# Patient Record
Sex: FEMALE | Race: WHITE | Employment: OTHER | URBAN - NONMETROPOLITAN AREA
[De-identification: names, ages, dates, MRNs, and addresses within clinical notes are randomized per-mention and may not be internally consistent; named-entity substitution may affect disease eponyms.]

---

## 2017-01-05 ENCOUNTER — OFFICE VISIT (OUTPATIENT)
Dept: INTERNAL MEDICINE CLINIC | Age: 67
End: 2017-01-05

## 2017-01-05 VITALS
DIASTOLIC BLOOD PRESSURE: 76 MMHG | WEIGHT: 135.2 LBS | HEART RATE: 60 BPM | OXYGEN SATURATION: 92 % | SYSTOLIC BLOOD PRESSURE: 162 MMHG | HEIGHT: 61 IN | RESPIRATION RATE: 16 BRPM | BODY MASS INDEX: 25.53 KG/M2 | TEMPERATURE: 97.7 F

## 2017-01-05 DIAGNOSIS — I10 HYPERTENSION, ESSENTIAL: ICD-10-CM

## 2017-01-05 DIAGNOSIS — J20.9 ACUTE BRONCHITIS, UNSPECIFIED: Primary | ICD-10-CM

## 2017-01-05 DIAGNOSIS — G25.81 RLS (RESTLESS LEGS SYNDROME): ICD-10-CM

## 2017-01-05 DIAGNOSIS — F10.11 H/O ALCOHOL ABUSE: ICD-10-CM

## 2017-01-05 DIAGNOSIS — M10.9 GOUT, ARTHRITIS: ICD-10-CM

## 2017-01-05 DIAGNOSIS — J44.9 CHRONIC OBSTRUCTIVE PULMONARY DISEASE, UNSPECIFIED COPD TYPE (HCC): ICD-10-CM

## 2017-01-05 DIAGNOSIS — Z72.0 TOBACCO ABUSE DISORDER: ICD-10-CM

## 2017-01-05 PROCEDURE — 99213 OFFICE O/P EST LOW 20 MIN: CPT | Performed by: INTERNAL MEDICINE

## 2017-01-05 RX ORDER — AZITHROMYCIN 250 MG/1
TABLET, FILM COATED ORAL
Qty: 1 PACKET | Refills: 0 | Status: SHIPPED
Start: 2017-01-05 | End: 2020-03-12 | Stop reason: CLARIF

## 2017-01-05 RX ORDER — ROPINIROLE 0.25 MG/1
0.25 TABLET, FILM COATED ORAL NIGHTLY
Qty: 30 TABLET | Refills: 3 | Status: SHIPPED
Start: 2017-01-05 | End: 2020-03-12

## 2017-01-05 RX ORDER — PREDNISONE 20 MG/1
20 TABLET ORAL DAILY
Qty: 5 TABLET | Refills: 0 | Status: SHIPPED | OUTPATIENT
Start: 2017-01-05 | End: 2017-01-10

## 2017-01-05 RX ORDER — DEXTROMETHORPHAN HYDROBROMIDE AND PROMETHAZINE HYDROCHLORIDE 15; 6.25 MG/5ML; MG/5ML
5 SYRUP ORAL 4 TIMES DAILY PRN
Qty: 180 ML | Refills: 0 | Status: SHIPPED | OUTPATIENT
Start: 2017-01-05 | End: 2017-01-12

## 2017-01-11 ASSESSMENT — ENCOUNTER SYMPTOMS
SHORTNESS OF BREATH: 0
EYES NEGATIVE: 1
HEMOPTYSIS: 0
SPUTUM PRODUCTION: 1
COUGH: 1

## 2020-01-01 ENCOUNTER — HOSPITAL ENCOUNTER (OUTPATIENT)
Age: 70
Discharge: HOME | End: 2020-11-13
Payer: MEDICARE

## 2020-01-01 ENCOUNTER — PROCEDURE VISIT (OUTPATIENT)
Dept: CARDIOLOGY CLINIC | Age: 70
End: 2020-01-01
Payer: MEDICARE

## 2020-01-01 ENCOUNTER — TELEMEDICINE (OUTPATIENT)
Dept: CARDIOLOGY CLINIC | Age: 70
End: 2020-01-01
Payer: MEDICARE

## 2020-01-01 ENCOUNTER — TELEPHONE (OUTPATIENT)
Dept: CARDIOLOGY CLINIC | Age: 70
End: 2020-01-01

## 2020-01-01 LAB
ANION GAP SERPL CALCULATED.3IONS-SCNC: 8 MMOL/L (ref 4–16)
BASOPHILS ABSOLUTE: 0.1 K/CU MM
BASOPHILS RELATIVE PERCENT: 0.9 % (ref 0–1)
BUN BLDV-MCNC: 11 MG/DL (ref 6–23)
CALCIUM SERPL-MCNC: 9.2 MG/DL (ref 8.3–10.6)
CHLORIDE BLD-SCNC: 96 MMOL/L (ref 99–110)
CO2: 30 MMOL/L (ref 21–32)
CREAT SERPL-MCNC: 0.8 MG/DL (ref 0.6–1.1)
DIFFERENTIAL TYPE: ABNORMAL
EOSINOPHILS ABSOLUTE: 0.1 K/CU MM
EOSINOPHILS RELATIVE PERCENT: 1.4 % (ref 0–3)
GFR AFRICAN AMERICAN: >60 ML/MIN/1.73M2
GFR NON-AFRICAN AMERICAN: >60 ML/MIN/1.73M2
GLUCOSE FASTING: 129 MG/DL (ref 70–99)
HCT VFR BLD CALC: 46.7 % (ref 37–47)
HEMOGLOBIN: 15.1 GM/DL (ref 12.5–16)
IMMATURE NEUTROPHIL %: 0.3 % (ref 0–0.43)
LV EF: 45 %
LV EF: 60 %
LVEF MODALITY: NORMAL
LVEF MODALITY: NORMAL
LYMPHOCYTES ABSOLUTE: 2 K/CU MM
LYMPHOCYTES RELATIVE PERCENT: 35.1 % (ref 24–44)
MCH RBC QN AUTO: 33 PG (ref 27–31)
MCHC RBC AUTO-ENTMCNC: 32.3 % (ref 32–36)
MCV RBC AUTO: 102 FL (ref 78–100)
MONOCYTES ABSOLUTE: 0.6 K/CU MM
MONOCYTES RELATIVE PERCENT: 10 % (ref 0–4)
PDW BLD-RTO: 13.7 % (ref 11.7–14.9)
PLATELET # BLD: 95 K/CU MM (ref 140–440)
PMV BLD AUTO: 10.8 FL (ref 7.5–11.1)
POTASSIUM SERPL-SCNC: 4.4 MMOL/L (ref 3.5–5.1)
RBC # BLD: 4.58 M/CU MM (ref 4.2–5.4)
SEGMENTED NEUTROPHILS ABSOLUTE COUNT: 3.1 K/CU MM
SEGMENTED NEUTROPHILS RELATIVE PERCENT: 52.3 % (ref 36–66)
SODIUM BLD-SCNC: 134 MMOL/L (ref 135–145)
TOTAL IMMATURE NEUTOROPHIL: 0.02 K/CU MM
WBC # BLD: 5.8 K/CU MM (ref 4–10.5)

## 2020-01-01 PROCEDURE — 3017F COLORECTAL CA SCREEN DOC REV: CPT | Performed by: INTERNAL MEDICINE

## 2020-01-01 PROCEDURE — G8400 PT W/DXA NO RESULTS DOC: HCPCS | Performed by: INTERNAL MEDICINE

## 2020-01-01 PROCEDURE — 80048 BASIC METABOLIC PNL TOTAL CA: CPT

## 2020-01-01 PROCEDURE — 78452 HT MUSCLE IMAGE SPECT MULT: CPT | Performed by: INTERNAL MEDICINE

## 2020-01-01 PROCEDURE — G8484 FLU IMMUNIZE NO ADMIN: HCPCS | Performed by: INTERNAL MEDICINE

## 2020-01-01 PROCEDURE — 1123F ACP DISCUSS/DSCN MKR DOCD: CPT | Performed by: INTERNAL MEDICINE

## 2020-01-01 PROCEDURE — 36415 COLL VENOUS BLD VENIPUNCTURE: CPT

## 2020-01-01 PROCEDURE — 93018 CV STRESS TEST I&R ONLY: CPT | Performed by: INTERNAL MEDICINE

## 2020-01-01 PROCEDURE — 1090F PRES/ABSN URINE INCON ASSESS: CPT | Performed by: INTERNAL MEDICINE

## 2020-01-01 PROCEDURE — 85025 COMPLETE CBC W/AUTO DIFF WBC: CPT

## 2020-01-01 PROCEDURE — 4040F PNEUMOC VAC/ADMIN/RCVD: CPT | Performed by: INTERNAL MEDICINE

## 2020-01-01 PROCEDURE — 93017 CV STRESS TEST TRACING ONLY: CPT | Performed by: INTERNAL MEDICINE

## 2020-01-01 PROCEDURE — 4004F PT TOBACCO SCREEN RCVD TLK: CPT | Performed by: INTERNAL MEDICINE

## 2020-01-01 PROCEDURE — 93306 TTE W/DOPPLER COMPLETE: CPT | Performed by: INTERNAL MEDICINE

## 2020-01-01 PROCEDURE — G8428 CUR MEDS NOT DOCUMENT: HCPCS | Performed by: INTERNAL MEDICINE

## 2020-01-01 PROCEDURE — A9500 TC99M SESTAMIBI: HCPCS | Performed by: INTERNAL MEDICINE

## 2020-01-01 PROCEDURE — G8417 CALC BMI ABV UP PARAM F/U: HCPCS | Performed by: INTERNAL MEDICINE

## 2020-01-01 PROCEDURE — 99214 OFFICE O/P EST MOD 30 MIN: CPT | Performed by: INTERNAL MEDICINE

## 2020-01-01 PROCEDURE — 93016 CV STRESS TEST SUPVJ ONLY: CPT | Performed by: INTERNAL MEDICINE

## 2020-01-01 RX ORDER — DIGOXIN 125 MCG
125 TABLET ORAL DAILY
Qty: 30 TABLET | Refills: 3 | OUTPATIENT
Start: 2020-01-01

## 2020-01-01 RX ORDER — ALLOPURINOL 100 MG/1
100 TABLET ORAL DAILY
Qty: 30 TABLET | Refills: 0 | Status: ON HOLD | OUTPATIENT
Start: 2020-01-01 | End: 2021-01-01 | Stop reason: SDUPTHER

## 2020-01-01 RX ORDER — DIGOXIN 125 MCG
125 TABLET ORAL DAILY
Qty: 30 TABLET | Refills: 5 | Status: ON HOLD | OUTPATIENT
Start: 2020-01-01 | End: 2021-01-01 | Stop reason: SDUPTHER

## 2020-01-01 RX ORDER — DIGOXIN 125 MCG
125 TABLET ORAL DAILY
Qty: 30 TABLET | Refills: 0 | Status: SHIPPED | OUTPATIENT
Start: 2020-01-01 | End: 2020-01-01 | Stop reason: SDUPTHER

## 2020-03-12 ENCOUNTER — OFFICE VISIT (OUTPATIENT)
Dept: INTERNAL MEDICINE CLINIC | Age: 70
End: 2020-03-12
Payer: MEDICARE

## 2020-03-12 ENCOUNTER — TELEPHONE (OUTPATIENT)
Dept: INTERNAL MEDICINE CLINIC | Age: 70
End: 2020-03-12

## 2020-03-12 VITALS
SYSTOLIC BLOOD PRESSURE: 132 MMHG | HEART RATE: 95 BPM | DIASTOLIC BLOOD PRESSURE: 80 MMHG | BODY MASS INDEX: 25.55 KG/M2 | OXYGEN SATURATION: 96 % | HEIGHT: 61 IN

## 2020-03-12 PROBLEM — M10.079 ACUTE IDIOPATHIC GOUT INVOLVING TOE: Status: ACTIVE | Noted: 2020-03-12

## 2020-03-12 PROBLEM — I48.20 CHRONIC ATRIAL FIBRILLATION (HCC): Status: ACTIVE | Noted: 2020-03-12

## 2020-03-12 LAB
A/G RATIO: 1 (ref 1.1–2.2)
ALBUMIN SERPL-MCNC: 4 G/DL (ref 3.4–5)
ALP BLD-CCNC: 189 U/L (ref 40–129)
ALT SERPL-CCNC: 15 U/L (ref 10–40)
ANION GAP SERPL CALCULATED.3IONS-SCNC: 16 MMOL/L (ref 3–16)
AST SERPL-CCNC: 34 U/L (ref 15–37)
BASOPHILS ABSOLUTE: 0.1 K/UL (ref 0–0.2)
BASOPHILS RELATIVE PERCENT: 0.7 %
BILIRUB SERPL-MCNC: 1.6 MG/DL (ref 0–1)
BUN BLDV-MCNC: 21 MG/DL (ref 7–20)
CALCIUM SERPL-MCNC: 9.5 MG/DL (ref 8.3–10.6)
CHLORIDE BLD-SCNC: 98 MMOL/L (ref 99–110)
CO2: 25 MMOL/L (ref 21–32)
CREAT SERPL-MCNC: 0.7 MG/DL (ref 0.6–1.2)
EOSINOPHILS ABSOLUTE: 0.1 K/UL (ref 0–0.6)
EOSINOPHILS RELATIVE PERCENT: 0.7 %
GFR AFRICAN AMERICAN: >60
GFR NON-AFRICAN AMERICAN: >60
GLOBULIN: 4.2 G/DL
GLUCOSE BLD-MCNC: 100 MG/DL (ref 70–99)
HCT VFR BLD CALC: 46.8 % (ref 36–48)
HEMOGLOBIN: 15.8 G/DL (ref 12–16)
LYMPHOCYTES ABSOLUTE: 1.3 K/UL (ref 1–5.1)
LYMPHOCYTES RELATIVE PERCENT: 16.5 %
MCH RBC QN AUTO: 33.9 PG (ref 26–34)
MCHC RBC AUTO-ENTMCNC: 33.9 G/DL (ref 31–36)
MCV RBC AUTO: 100.3 FL (ref 80–100)
MONOCYTES ABSOLUTE: 0.7 K/UL (ref 0–1.3)
MONOCYTES RELATIVE PERCENT: 9.3 %
NEUTROPHILS ABSOLUTE: 5.5 K/UL (ref 1.7–7.7)
NEUTROPHILS RELATIVE PERCENT: 72.8 %
PDW BLD-RTO: 14.7 % (ref 12.4–15.4)
PLATELET # BLD: 135 K/UL (ref 135–450)
PMV BLD AUTO: 9.7 FL (ref 5–10.5)
POTASSIUM SERPL-SCNC: 4.5 MMOL/L (ref 3.5–5.1)
RBC # BLD: 4.67 M/UL (ref 4–5.2)
SODIUM BLD-SCNC: 139 MMOL/L (ref 136–145)
TOTAL PROTEIN: 8.2 G/DL (ref 6.4–8.2)
WBC # BLD: 7.6 K/UL (ref 4–11)

## 2020-03-12 PROCEDURE — 36415 COLL VENOUS BLD VENIPUNCTURE: CPT | Performed by: INTERNAL MEDICINE

## 2020-03-12 PROCEDURE — 99214 OFFICE O/P EST MOD 30 MIN: CPT | Performed by: INTERNAL MEDICINE

## 2020-03-12 PROCEDURE — 93000 ELECTROCARDIOGRAM COMPLETE: CPT | Performed by: INTERNAL MEDICINE

## 2020-03-12 RX ORDER — AMOXICILLIN AND CLAVULANATE POTASSIUM 875; 125 MG/1; MG/1
TABLET, FILM COATED ORAL
COMMUNITY
Start: 2020-03-11 | End: 2020-03-24 | Stop reason: ALTCHOICE

## 2020-03-12 RX ORDER — PREDNISONE 20 MG/1
20 TABLET ORAL DAILY
Qty: 5 TABLET | Refills: 0 | Status: SHIPPED | OUTPATIENT
Start: 2020-03-12 | End: 2020-03-17

## 2020-03-12 RX ORDER — METOPROLOL SUCCINATE 25 MG/1
12.5 TABLET, EXTENDED RELEASE ORAL DAILY
Qty: 15 TABLET | Refills: 5 | Status: SHIPPED | OUTPATIENT
Start: 2020-03-12 | End: 2020-04-29

## 2020-03-12 ASSESSMENT — PATIENT HEALTH QUESTIONNAIRE - PHQ9
SUM OF ALL RESPONSES TO PHQ QUESTIONS 1-9: 0
SUM OF ALL RESPONSES TO PHQ QUESTIONS 1-9: 0
2. FEELING DOWN, DEPRESSED OR HOPELESS: 0
SUM OF ALL RESPONSES TO PHQ9 QUESTIONS 1 & 2: 0
1. LITTLE INTEREST OR PLEASURE IN DOING THINGS: 0

## 2020-03-12 NOTE — TELEPHONE ENCOUNTER
Attempted to contact patient and obtain her correct Medicare ID number, as it is missing from her account.

## 2020-03-12 NOTE — PROGRESS NOTES
HISTORY OF PRESENT ILLNESS  Nelida Garay is a 76 y.o. female. 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 She appears well-developed and well-nourished. Neck: No JVD present. Cardiovascular: Normal rate, regular rhythm and intact distal pulses. Exam reveals no gallop and no friction rub. No murmur heard. Pulmonary/Chest: Effort normal and breath sounds normal. No respiratory distress. She has no wheezes. Musculoskeletal: She exhibits no edema. Neurological: She is alert and oriented to person, place, and time. Skin: Skin is warm. Nursing note and vitals reviewed. ASSESSMENT and PLAN  Encounter Diagnoses   Name Primary?  Hypercholesteremia Yes     Orders Placed This Encounter    LIPID PANEL    simvastatin (ZOCOR) 10 mg tablet    aspirin 81 mg chewable tablet    loratadine (CLARITIN) 10 mg tablet    varicella-zoster recombinant, PF, (SHINGRIX, PF,) 50 mcg/0.5 mL susr injection     Labs updated today   Given Rx of Shingles to do vaccine  Follow-up and Dispositions    · Return for medicare wellness visit with fasting labs after 10/8/19. I have discussed the diagnosis with the patient and the intended plan as seen in the above orders.  The patient has received an after-visit summary and questions were answered Jonny Hernandez  Patient's  is 1950  Seen in office on 3/12/2020      SUBJECTIVE:  Marcos Bowles steven 78 y. o.year old female presents today   Chief Complaint   Patient presents with    Foot Pain     Pt lost to f/u  Was seen last in   She had h/o HTN and COPD but stopped taking medications  Occasional use of albuterol inh  She is here today with her son-in-law    Pt states she stubbed her left foot on wooden step on 3/7/2020  Developed swelling and pain in the left foot dorsum  Went to [facility]  Given augmentin   Pt is here for f/u  X ray of the left foot done at [facility] urgent care: Showed mild soft tissue swelling is noted diffusely. Moderate degenerative changes are noted in the midfoot and hindfoot. No fracture is appreciated. Great toe is unremarkable. Patient states pain in the left foot is getting better. She denies any fever or chills. She has a history of hypertension but has not taken medications for some time. She also has a history of COPD but is not taking inhalers  Patient denies any chest pain. No shortness of breath. No palpitations. No dizziness. No syncope or near syncope. Taking medications regularly. No side effects noted. Review of Systems   Constitutional: Negative. Negative for chills and fever. HENT: Negative. Eyes: Negative. Respiratory: Negative. Negative for cough, shortness of breath and wheezing. Cardiovascular: Negative for chest pain, palpitations and leg swelling. Gastrointestinal: Negative. Endocrine: Negative. Genitourinary: Negative. Musculoskeletal: Positive for joint swelling. Skin: Negative. Allergic/Immunologic: Negative. Neurological: Negative. Hematological: Negative. Psychiatric/Behavioral: Negative.         OBJECTIVE: /80   Pulse 95   Ht 5' 1\" (1.549 m)   SpO2 96%   BMI 25.55 kg/m²     Wt Readings from Last 3 Encounters:   17 135 lb 3.2 oz (61.3 kg)   16 136 lb 6.4 oz (61.9 kg) 10/05/16 137 lb 9.6 oz (62.4 kg)      GENERAL: - Alert, oriented, pleasant, in no apparent distress. HEENT: - Conjunctiva pink, no scleral icterus. ENT clear. NECK: -Supple. No jugular venous distention noted. No masses felt,  CARDIOVASCULAR: - Normal S1 and S2. Irregularly irregular  PULMONARY: - No respiratory distress. No wheezes or rales. ABDOMEN: - Soft and non-tender,no masses  ororganomegaly. EXTREMITIES: - No cyanosis, clubbing, or significant edema. Patient has tenderness and swelling of the first metacarpophalangeal joint of the left foot. There is some associated swelling and redness. SKIN: Skin is warm and dry. NEUROLOGICAL: - Cranial nerves II through XII are grossly intact. IMPRESSION:    Encounter Diagnoses   Name Primary?  Acute idiopathic gout involving toe of left foot Yes    Essential hypertension     Chronic obstructive pulmonary disease, unspecified COPD type ([facility] [address]     Chronic atrial fibrillation     Hypertension, essential     Tobacco abuse disorder        ASSESSMENT/PLAN:      Acute idiopathic gout involving toe  Patient has pain and swelling of the left first metacarpophalangeal joint. Looks like gout. Will give prednisone 20 mg daily for 5 days. Continue Augmentin. Will check uric acid. Return to office in 1 week    Chronic atrial fibrillation  Patient has developed new onset A. fib. Recent EKGs. Will start patient on metoprolol ER 25 mg daily and refer patient to cardiologist.  May need anticoagulation    COPD (chronic obstructive pulmonary disease) ([facility] [address]  Patient has COPD and has seen pulmonologist in the past but he stopped seeing the pulmonologist and stopped taking the inhalers. Noncompliant. Does not want any inhalers now    Hypertension, essential  Patient blood pressure is out any medication. Will observe for now. Patient to follow low-salt diet.     Tobacco abuse disorder  Counseled patient to quit smoking    Return to office in 1 concerning future plans. Patient conveyed understanding of the plan at the time of the visit.     Eduin Temple, MSN, ANP  4/8/2019

## 2020-03-18 ASSESSMENT — ENCOUNTER SYMPTOMS
SHORTNESS OF BREATH: 0
ALLERGIC/IMMUNOLOGIC NEGATIVE: 1
GASTROINTESTINAL NEGATIVE: 1
COUGH: 0
RESPIRATORY NEGATIVE: 1
EYES NEGATIVE: 1
WHEEZING: 0

## 2020-03-19 NOTE — ASSESSMENT & PLAN NOTE
Patient has pain and swelling of the left first metacarpophalangeal joint. Looks like gout. Will give prednisone 20 mg daily for 5 days. Continue Augmentin. Will check uric acid.   Return to office in 1 week

## 2020-03-19 NOTE — ASSESSMENT & PLAN NOTE
Patient blood pressure is out any medication. Will observe for now. Patient to follow low-salt diet.

## 2020-03-24 ENCOUNTER — OFFICE VISIT (OUTPATIENT)
Dept: INTERNAL MEDICINE CLINIC | Age: 70
End: 2020-03-24
Payer: MEDICARE

## 2020-03-24 VITALS
BODY MASS INDEX: 28.15 KG/M2 | RESPIRATION RATE: 16 BRPM | SYSTOLIC BLOOD PRESSURE: 118 MMHG | HEART RATE: 64 BPM | WEIGHT: 149 LBS | TEMPERATURE: 98.5 F | OXYGEN SATURATION: 93 % | DIASTOLIC BLOOD PRESSURE: 68 MMHG

## 2020-03-24 LAB — URIC ACID, SERUM: 6 MG/DL (ref 2.6–6)

## 2020-03-24 PROCEDURE — 3017F COLORECTAL CA SCREEN DOC REV: CPT | Performed by: INTERNAL MEDICINE

## 2020-03-24 PROCEDURE — 99214 OFFICE O/P EST MOD 30 MIN: CPT | Performed by: INTERNAL MEDICINE

## 2020-03-24 PROCEDURE — G8400 PT W/DXA NO RESULTS DOC: HCPCS | Performed by: INTERNAL MEDICINE

## 2020-03-24 PROCEDURE — G8427 DOCREV CUR MEDS BY ELIG CLIN: HCPCS | Performed by: INTERNAL MEDICINE

## 2020-03-24 PROCEDURE — 36415 COLL VENOUS BLD VENIPUNCTURE: CPT | Performed by: INTERNAL MEDICINE

## 2020-03-24 PROCEDURE — 1090F PRES/ABSN URINE INCON ASSESS: CPT | Performed by: INTERNAL MEDICINE

## 2020-03-24 PROCEDURE — 4004F PT TOBACCO SCREEN RCVD TLK: CPT | Performed by: INTERNAL MEDICINE

## 2020-03-24 PROCEDURE — G8484 FLU IMMUNIZE NO ADMIN: HCPCS | Performed by: INTERNAL MEDICINE

## 2020-03-24 PROCEDURE — G8926 SPIRO NO PERF OR DOC: HCPCS | Performed by: INTERNAL MEDICINE

## 2020-03-24 PROCEDURE — 1123F ACP DISCUSS/DSCN MKR DOCD: CPT | Performed by: INTERNAL MEDICINE

## 2020-03-24 PROCEDURE — 3023F SPIROM DOC REV: CPT | Performed by: INTERNAL MEDICINE

## 2020-03-24 PROCEDURE — G8417 CALC BMI ABV UP PARAM F/U: HCPCS | Performed by: INTERNAL MEDICINE

## 2020-03-24 PROCEDURE — 4040F PNEUMOC VAC/ADMIN/RCVD: CPT | Performed by: INTERNAL MEDICINE

## 2020-03-24 NOTE — PROGRESS NOTES
Lab draw, 1 sst, BF needle, left A/C good blood flow, tolerated well
Allergies   Allergen Reactions    Aspirin Rash     Current Outpatient Medications   Medication Sig Dispense Refill    metoprolol succinate (TOPROL XL) 25 MG extended release tablet Take 0.5 tablets by mouth daily 15 tablet 5    albuterol sulfate  (90 BASE) MCG/ACT inhaler Inhale 2 puffs into the lungs every 6 hours as needed for Shortness of Breath 1 Inhaler 3     No current facility-administered medications for this visit. Past Medical History:   Diagnosis Date    Acute respiratory failure ([facility] [address] 10/20/2015    After cataract surgery, unable to wean her off    Cataract extraction status of right eye 2015    Chronic atrial fibrillation 3/12/2020    New onset atrial fibriallation Metoprolol ER 25 mg daily Refer to cardiologist        Colonoscopy refused     discussed in     COPD (chronic obstructive pulmonary disease) ([facility] [address]     seen Dr Blanca Cortes Depression 2015    resolved    Gout     Mammogram declined     discussed in     Pneumonia     Tobacco abuse disorder      Past Surgical History:   Procedure Laterality Date    CATARACT REMOVAL WITH IMPLANT  10/20/2015    both eyes    DENTAL SURGERY  2016    awaiting full dentures    FRACTURE SURGERY Left ~ -     wrist surgery    HYSTERECTOMY  In her late s     Social History     Tobacco Use    Smoking status: Current Every Day Smoker     Packs/day: 0.50     Years: 31.00     Pack years: 15.50     Types: Cigarettes     Last attempt to quit: 10/22/2015     Years since quittin.4    Smokeless tobacco: Never Used   Substance Use Topics    Alcohol use:  Yes     Alcohol/week: 4.0 - 6.0 standard drinks     Types: 4 - 6 Cans of beer per week     Comment: currently intoxicated       LAB REVIEW:  CBC:   Lab Results   Component Value Date    WBC 7.6 2020    HGB 15.8 2020    HCT 46.8 2020     2020     Lipids:   Lab Results   Component Value Date    CHOL 185 2013    TRIG 110

## 2020-04-22 ENCOUNTER — VIRTUAL VISIT (OUTPATIENT)
Dept: INTERNAL MEDICINE CLINIC | Age: 70
End: 2020-04-22
Payer: MEDICARE

## 2020-04-22 PROCEDURE — 1090F PRES/ABSN URINE INCON ASSESS: CPT | Performed by: INTERNAL MEDICINE

## 2020-04-22 PROCEDURE — G8400 PT W/DXA NO RESULTS DOC: HCPCS | Performed by: INTERNAL MEDICINE

## 2020-04-22 PROCEDURE — 3017F COLORECTAL CA SCREEN DOC REV: CPT | Performed by: INTERNAL MEDICINE

## 2020-04-22 PROCEDURE — G8427 DOCREV CUR MEDS BY ELIG CLIN: HCPCS | Performed by: INTERNAL MEDICINE

## 2020-04-22 PROCEDURE — 1123F ACP DISCUSS/DSCN MKR DOCD: CPT | Performed by: INTERNAL MEDICINE

## 2020-04-22 PROCEDURE — 99213 OFFICE O/P EST LOW 20 MIN: CPT | Performed by: INTERNAL MEDICINE

## 2020-04-22 PROCEDURE — 4040F PNEUMOC VAC/ADMIN/RCVD: CPT | Performed by: INTERNAL MEDICINE

## 2020-04-22 RX ORDER — ALLOPURINOL 100 MG/1
100 TABLET ORAL DAILY
Qty: 30 TABLET | Refills: 5 | Status: SHIPPED | OUTPATIENT
Start: 2020-04-22 | End: 2020-01-01

## 2020-04-22 ASSESSMENT — PATIENT HEALTH QUESTIONNAIRE - PHQ9
1. LITTLE INTEREST OR PLEASURE IN DOING THINGS: 0
SUM OF ALL RESPONSES TO PHQ QUESTIONS 1-9: 0
SUM OF ALL RESPONSES TO PHQ QUESTIONS 1-9: 0
SUM OF ALL RESPONSES TO PHQ9 QUESTIONS 1 & 2: 0
2. FEELING DOWN, DEPRESSED OR HOPELESS: 0

## 2020-04-22 NOTE — PROGRESS NOTES
2020    TELEHEALTH EVALUATION -- Audio/Visual (During AEYKR-14 public health emergency)    HPI:    Josiane Rivera (:  1950) has requested an audio/video evaluation for the following concern(s):    Chief Complaint   Patient presents with    1 Month Follow-Up    Medication Refill     She is here for follow-up of  Atrial fibrillation  Gout attack  COPD    Patient states she is feeling well. She has atrial fibrillation but denies any chest pain. No palpitations. No dizziness. No syncope or near syncope. She is taking Xarelto 15 mg daily without any side effects. Denies any bleeding or bruising. She has not seen the cardiologist yet because of coronavirus scare. She does have an appointment at the end of this month to see the cardiologist.    Patient had gout attack few months ago. In the past she has a history of gout. Uric acid is still not elevated much this is 6.0. However will start patient back on allopurinol 100 mg daily  . Patient is feeling well otherwise. COPD is stable on albuterol as needed    Patient's son-in-law is with the patient during this virtual visit in the parking lot. Review of Systems    Prior to Visit Medications    Medication Sig Taking? Authorizing Provider   rivaroxaban (XARELTO) 15 MG TABS tablet Take 1 tablet by mouth daily (with breakfast) Yes Tila Clark MD   metoprolol succinate (TOPROL XL) 25 MG extended release tablet Take 0.5 tablets by mouth daily Yes Tila Clark MD   albuterol sulfate  (90 BASE) MCG/ACT inhaler Inhale 2 puffs into the lungs every 6 hours as needed for Shortness of Breath Yes Tila Clark MD       Social History     Tobacco Use    Smoking status: Current Every Day Smoker     Packs/day: 0.50     Years: 31.00     Pack years: 15.50     Types: Cigarettes     Last attempt to quit: 10/22/2015     Years since quittin.5    Smokeless tobacco: Never Used   Substance Use Topics    Alcohol use:  Yes     Alcohol/week: 4.0 - 6.0 standard drinks     Types: 4 - 6 Cans of beer per week     Comment: currently intoxicated    Drug use: No            PHYSICAL EXAMINATION:  [ INSTRUCTIONS:  \"[x]\" Indicates a positive item  \"[]\" Indicates a negative item  -- DELETE ALL ITEMS NOT EXAMINED]  Vital Signs: (As obtained by patient/caregiver or practitioner observation)    Blood pressure-  Heart rate-    Respiratory rate-    Temperature-  Pulse oximetry-     Constitutional: [x] Appears well-developed and well-nourished [] No apparent distress      [] Abnormal-   Mental status  [x] Alert and awake  [x] Oriented to person/place/time [x]Able to follow commands      Eyes:  EOM    [x]  Normal  [] Abnormal-  Sclera  []  Normal  [] Abnormal -         Discharge []  None visible  [] Abnormal -    HENT:   [x] Normocephalic, atraumatic. [] Abnormal   [] Mouth/Throat: Mucous membranes are moist.     External Ears [x] Normal  [] Abnormal-     Neck: [x] No visualized mass     Pulmonary/Chest: [x] Respiratory effort normal.  [x] No visualized signs of difficulty breathing or respiratory distress        [] Abnormal-      Musculoskeletal:   [x] Normal gait with no signs of ataxia         [] Normal range of motion of neck        [] Abnormal-       Neurological:        [x] No Facial Asymmetry (Cranial nerve 7 motor function) (limited exam to video visit)          [] No gaze palsy        [] Abnormal-         Skin:        [x] No significant exanthematous lesions or discoloration noted on facial skin         [] Abnormal-            Psychiatric:       [x] Normal Affect [] No Hallucinations        [] Abnormal-     Other pertinent observable physical exam findings-     ASSESSMENT/PLAN:  Chronic atrial fibrillation  Patient has atrial fibrillation and is taking metoprolol ER 12.5 mg daily. Continue the same. Continue Xarelto 15 mg daily.   Patient has an appointment with the cardiologist at the end of this month    COPD (chronic obstructive pulmonary disease) ([facility] [address]  Patient has history of COPD and takes albuterol inhaler only occasionally    Gout, arthritis  History of gout in the past.  Will start allopurinol 100 mg daily        Return to office in 2 months      Shweta Dimas is a 78 y.o. female being evaluated by a Virtual Visit (video visit) encounter to address concerns as mentioned above. A caregiver was present when appropriate. Due to this being a TeleHealth encounter (During [facility] public health emergency), evaluation of the following organ systems was limited: Vitals/Constitutional/EENT/Resp/CV/GI//MS/Neuro/Skin/Heme-Lymph-Imm. Pursuant to the emergency declaration under the [address] [place] [address], [address] [place] [address] authority and the [employer] Act, this Virtual Visit was conducted with patient's (and/or legal guardian's) consent, to reduce the patient's risk of exposure to COVID-19 and provide necessary medical care. The patient (and/or legal guardian) has also been advised to contact this office for worsening conditions or problems, and seek emergency medical treatment and/or call 911 if deemed necessary. Services were provided through a video synchronous discussion virtually to substitute for in-person clinic visit. Patient and provider were located at their individual homes. --Dante Og MD on 4/22/2020 at 3:41 PM    An electronic signature was used to authenticate this note.  visit

## 2020-04-29 ENCOUNTER — INITIAL CONSULT (OUTPATIENT)
Dept: CARDIOLOGY CLINIC | Age: 70
End: 2020-04-29
Payer: MEDICARE

## 2020-04-29 ENCOUNTER — NURSE ONLY (OUTPATIENT)
Dept: CARDIOLOGY CLINIC | Age: 70
End: 2020-04-29
Payer: MEDICARE

## 2020-04-29 VITALS
WEIGHT: 149 LBS | RESPIRATION RATE: 16 BRPM | BODY MASS INDEX: 25.44 KG/M2 | DIASTOLIC BLOOD PRESSURE: 66 MMHG | HEIGHT: 64 IN | SYSTOLIC BLOOD PRESSURE: 96 MMHG | HEART RATE: 92 BPM

## 2020-04-29 PROCEDURE — 93225 XTRNL ECG REC<48 HRS REC: CPT | Performed by: INTERNAL MEDICINE

## 2020-04-29 PROCEDURE — G8417 CALC BMI ABV UP PARAM F/U: HCPCS | Performed by: INTERNAL MEDICINE

## 2020-04-29 PROCEDURE — 99205 OFFICE O/P NEW HI 60 MIN: CPT | Performed by: INTERNAL MEDICINE

## 2020-04-29 PROCEDURE — 1090F PRES/ABSN URINE INCON ASSESS: CPT | Performed by: INTERNAL MEDICINE

## 2020-04-29 PROCEDURE — G8427 DOCREV CUR MEDS BY ELIG CLIN: HCPCS | Performed by: INTERNAL MEDICINE

## 2020-04-29 RX ORDER — BLOOD PRESSURE TEST KIT
1 KIT MISCELLANEOUS ONCE
Qty: 1 KIT | Refills: 0 | Status: SHIPPED | OUTPATIENT
Start: 2020-04-29 | End: 2020-04-29

## 2020-04-29 RX ORDER — DIGOXIN 125 MCG
125 TABLET ORAL DAILY
Qty: 30 TABLET | Refills: 3 | Status: SHIPPED | OUTPATIENT
Start: 2020-04-29 | End: 2020-01-01

## 2020-04-29 RX ORDER — IPRATROPIUM BROMIDE 21 UG/1
2 SPRAY, METERED NASAL EVERY 12 HOURS
Qty: 1 BOTTLE | Refills: 3 | Status: ON HOLD | OUTPATIENT
Start: 2020-04-29 | End: 2021-01-01 | Stop reason: SDUPTHER

## 2020-04-29 NOTE — PROGRESS NOTES
family history of CAD, STROKE of DM    Allergies   Allergen Reactions    Aspirin Rash       No current facility-administered medications for this visit. Current Outpatient Medications   Medication Sig Dispense Refill    digoxin (LANOXIN) 125 MCG tablet Take 1 tablet by mouth daily 30 tablet 3    Blood Pressure Monitor KIT 1 kit by Does not apply route once for 1 dose 1 kit 0    rivaroxaban (XARELTO) 15 MG TABS tablet Take 1 tablet by mouth daily (with breakfast) 30 tablet 5    allopurinol (ZYLOPRIM) 100 MG tablet Take 1 tablet by mouth daily 30 tablet 5    albuterol sulfate  (90 BASE) MCG/ACT inhaler Inhale 2 puffs into the lungs every 6 hours as needed for Shortness of Breath 1 Inhaler 3     No current facility-administered medications for this visit. Review of Systems:   · Constitutional: No Fever or Weight Loss   · Eyes: No Decreased Vision  · ENT: No Headaches, Hearing Loss or Vertigo  · Cardiovascular: No chest pain,+ dyspnea on exertion, palpitations or loss of consciousness  · Respiratory: No cough or wheezing    · Gastrointestinal: No abdominal pain, appetite loss, blood in stools, constipation, diarrhea or heartburn  · Genitourinary: No dysuria, trouble voiding, or hematuria  · Musculoskeletal:  No gait disturbance, weakness or joint complaints  · Integumentary: No rash or pruritis  · Neurological: No TIA or stroke symptoms  · Psychiatric: No anxiety or depression  · Endocrine: No malaise, fatigue or temperature intolerance  · Hematologic/Lymphatic: No bleeding problems, blood clots or swollen lymph nodes  · Allergic/Immunologic: No nasal congestion or hives  All systems negative except as marked. ·   ·      Physical Examination:    Vitals:    04/29/20 1436   BP: 96/66   Pulse: 92   Resp: 16    afebrile  Wt Readings from Last 3 Encounters:   04/29/20 149 lb (67.6 kg)   03/24/20 149 lb (67.6 kg)   01/05/17 135 lb 3.2 oz (61.3 kg)     Body mass index is 25.58 kg/m².     General hours. No results found for: BNP  No results found for: INR, PROTIME      EKG:afib    Chest Xray:pending    ECHO:pending  Labs, echo, meds reviewed  Assessment: 78 y. o.year old with PMH of  has a past medical history of Acute respiratory failure ([facility] [address] Cataract extraction status of right eye, Chronic atrial fibrillation, Colonoscopy refused, COPD (chronic obstructive pulmonary disease) ([facility] [address] Depression, Gout, Mammogram declined, Pneumonia, and Tobacco abuse disorder. Recommendations:    1. Chronic afib: her blood pressure is low today, will d/c toprol xl and change it with digoxin, basix labs ordered to check CBC, chem 7 to rule out bleeding with xarelto and possible dehydration. stress test and echo ordered  2. COPD\" with wheezing, toprol xl d.daniele, digoxin added, will d.c albuterol and change it to atrovent since albuterol can cause tachycardia   3. shorntess of breath; as above from COPD,will get echo to check LVEF and RV pressure  4. Hypotension: as above, d./c toprol xl, basic labs ordered, blood pressure cuff ordered  5. Health maintenance: exerise and diet  All labs, medications and tests reviewed, continue all other medications of all above medical condition listed as is.          Anil Lobato MD, 4/29/2020 3:44 PM

## 2020-04-30 ENCOUNTER — TELEPHONE (OUTPATIENT)
Dept: CARDIOLOGY CLINIC | Age: 70
End: 2020-04-30

## 2020-04-30 ENCOUNTER — TELEPHONE (OUTPATIENT)
Dept: INTERNAL MEDICINE CLINIC | Age: 70
End: 2020-04-30

## 2020-04-30 ENCOUNTER — VIRTUAL VISIT (OUTPATIENT)
Dept: INTERNAL MEDICINE CLINIC | Age: 70
End: 2020-04-30
Payer: MEDICARE

## 2020-04-30 PROCEDURE — 1090F PRES/ABSN URINE INCON ASSESS: CPT | Performed by: INTERNAL MEDICINE

## 2020-04-30 PROCEDURE — 4040F PNEUMOC VAC/ADMIN/RCVD: CPT | Performed by: INTERNAL MEDICINE

## 2020-04-30 PROCEDURE — 1123F ACP DISCUSS/DSCN MKR DOCD: CPT | Performed by: INTERNAL MEDICINE

## 2020-04-30 PROCEDURE — 99213 OFFICE O/P EST LOW 20 MIN: CPT | Performed by: INTERNAL MEDICINE

## 2020-04-30 PROCEDURE — G8427 DOCREV CUR MEDS BY ELIG CLIN: HCPCS | Performed by: INTERNAL MEDICINE

## 2020-04-30 PROCEDURE — 3017F COLORECTAL CA SCREEN DOC REV: CPT | Performed by: INTERNAL MEDICINE

## 2020-04-30 PROCEDURE — G8400 PT W/DXA NO RESULTS DOC: HCPCS | Performed by: INTERNAL MEDICINE

## 2020-04-30 RX ORDER — PREDNISONE 20 MG/1
20 TABLET ORAL DAILY
Qty: 5 TABLET | Refills: 0 | Status: SHIPPED | OUTPATIENT
Start: 2020-04-30 | End: 2020-05-05

## 2020-04-30 NOTE — PROGRESS NOTES
2020    TELEHEALTH EVALUATION -- Audio/Visual (During TFZRZ-33 public health emergency)    HPI:    Wood Sweeney (:  1950) has requested an audio/video evaluation for the following concern(s):    Chief Complaint   Patient presents with    Foot Pain    Other     Patient is complaining of pain of the left foot with swelling with slight discoloration. The foot is not cold per son. Pain is mostly in the top of the foot. Patient denies any spraining. Patient states she had gout attacks in the past and it is similar to that. She is taking allopurinol    She also saw cardiologist yesterday. BP was low therefore he stopped her Toprol and started patient on digoxin    She was also tachycardic therefore he discontinued albuterol and started patient on Atrovent inhaler    Patient thought she was supposed to stop Xarelto and therefore the the did not take the Xarelto today. Reviewed the notes of Dr. Carmelita Cockayne and he told the patient to stop Toprol on Xarelto. Conveyed the message to patient to start taking Xarelto. Review of Systems    Prior to Visit Medications    Medication Sig Taking? Authorizing Provider   rivaroxaban (XARELTO) 15 MG TABS tablet Take 15 mg by mouth daily Yes Historical Provider, MD   digoxin (LANOXIN) 125 MCG tablet Take 1 tablet by mouth daily Yes Aleksandr Saul MD   ipratropium (ATROVENT) 0.03 % nasal spray 2 sprays by Each Nostril route every 12 hours Yes Aleksandr Saul MD   allopurinol (ZYLOPRIM) 100 MG tablet Take 1 tablet by mouth daily Yes Celina Mccauley MD   Blood Pressure Monitor KIT 1 kit by Does not apply route once for 1 dose  Aleksandr Saul MD       Social History     Tobacco Use    Smoking status: Current Every Day Smoker     Packs/day: 0.25     Years: 31.00     Pack years: 7.75     Types: Cigarettes     Last attempt to quit: 10/22/2015     Years since quittin.5    Smokeless tobacco: Never Used   Substance Use Topics    Alcohol use:  Yes Alcohol/week: 4.0 - 6.0 standard drinks     Types: 4 - 6 Cans of beer per week     Comment: currently intoxicated    Drug use: No        Allergies   Allergen Reactions    Aspirin Rash       PHYSICAL EXAMINATION:  [ INSTRUCTIONS:  \"[x]\" Indicates a positive item  \"[]\" Indicates a negative item  -- DELETE ALL ITEMS NOT EXAMINED]  Vital Signs: (As obtained by patient/caregiver or practitioner observation)    Blood pressure-  Heart rate-    Respiratory rate-    Temperature-  Pulse oximetry-     Constitutional: [x] Appears well-developed and well-nourished [] No apparent distress      [] Abnormal-   Mental status  [x] Alert and awake  [x] Oriented to person/place/time [x]Able to follow commands      Eyes:  EOM    [x]  Normal  [] Abnormal-  Sclera  []  Normal  [] Abnormal -         Discharge []  None visible  [] Abnormal -    HENT:   [x] Normocephalic, atraumatic. [] Abnormal   [] Mouth/Throat: Mucous membranes are moist.     External Ears [x] Normal  [] Abnormal-     Neck: [] No visualized mass     Pulmonary/Chest: [x] Respiratory effort normal.  [x] No visualized signs of difficulty breathing or respiratory distress        [] Abnormal-      Musculoskeletal:   [] Normal gait with no signs of ataxia         [] Normal range of motion of neck        [] Abnormal- left foot swollen : slight discoloration. Painful when son touched it. Not cold. Neurological:        [x] No Facial Asymmetry (Cranial nerve 7 motor function) (limited exam to video visit)          [x] No gaze palsy        [] Abnormal-         Skin:        [x] No significant exanthematous lesions or discoloration noted on facial skin         [] Abnormal-            Psychiatric:       [x] Normal Affect [] No Hallucinations        [] Abnormal-     Other pertinent observable physical exam findings-     ASSESSMENT/PLAN:     Encounter Diagnoses   Name Primary?     Gout, arthritis Yes    Chronic atrial fibrillation     Chronic obstructive pulmonary disease,

## 2020-04-30 NOTE — TELEPHONE ENCOUNTER
Called patient to schedule echo and cardiolite. Left message for patient to return call. Authorization not required. Weight 149lbs.

## 2020-05-01 NOTE — TELEPHONE ENCOUNTER
[facility] (formerly [facility]3] - pregnancy, meaning it will cause fetal teratogenic malformations, and pregnancy Hannah Torres MD  [facility] [place]

## 2020-05-04 ENCOUNTER — TELEPHONE (OUTPATIENT)
Dept: CARDIOLOGY CLINIC | Age: 70
End: 2020-05-04

## 2020-05-04 PROCEDURE — 93227 XTRNL ECG REC<48 HR R&I: CPT | Performed by: INTERNAL MEDICINE

## 2020-05-11 ENCOUNTER — TELEPHONE (OUTPATIENT)
Dept: CARDIOLOGY CLINIC | Age: 70
End: 2020-05-11

## 2020-05-19 ENCOUNTER — TELEPHONE (OUTPATIENT)
Dept: CARDIOLOGY CLINIC | Age: 70
End: 2020-05-19

## 2020-09-17 NOTE — TELEPHONE ENCOUNTER
[facility] to [facility]:  Digoxin 0.125 mg qd #30 Rx2 pended and routed to [facility] for approval.

## 2020-09-17 NOTE — TELEPHONE ENCOUNTER
Patient needs an appointment with an EKG and follow up with cardiology for continued refills of this medication by our office

## 2020-09-21 NOTE — TELEPHONE ENCOUNTER
L/M to let patient know we did send 30-day supply of medication to her pharmacy, but she needs to call and schedule OV/EKG w/Dr. Trang Cooper before this supply runs out in order for us to be able to continue to refill her Digoxin.

## 2020-10-27 NOTE — TELEPHONE ENCOUNTER
Patient was started on digoxin and April and has not followed up. She did not get echo, stress test, or labs to ensure she did not have a GI bleed. Patient needs appointment scheduled ASAP.

## 2020-10-27 NOTE — TELEPHONE ENCOUNTER
I will have patient schedule for appointment before leaving today.  RX's routed to [employer] for approval.

## 2020-10-29 NOTE — TELEPHONE ENCOUNTER
Left ventricular systolic function is mildly depressed with an ejection   fraction of 45%. Doppler evaluation reveals moderate aortic and tricuspid and mild mitral   regurgitation. Right ventricular systolic pressure of 61 mmHg consistent with moderate   pulmonary hypertension. No evidence of pericardial effusion. Nuclear Stress: Normal    LM for patient to contact office for message.

## 2020-11-04 NOTE — PROGRESS NOTES
Lorie Cortes MD    TELEHEALTH EVALUATION -- Audio/Visual (During [facility] public health emergency)      Chief complaints: patient is here for management of PAF, copd, hypotension    Subjective: patient feels better, no chest pain, no shortness of breath, no dizziness, no palpitations    HPI Ryan Bermudez is a 69 y. o.year old who  has a past medical history of Acute respiratory failure ([facility] Cataract extraction status of right eye, Chronic atrial fibrillation ([facility] Colonoscopy refused, COPD (chronic obstructive pulmonary disease) ([facility] Depression, Gout, H/O 24 hour EKG monitoring, H/O echocardiogram, History of nuclear stress test, Mammogram declined, Pneumonia, and Tobacco abuse disorder. and presents for management of PAF, copd, hypotensionhich are well controlled    LAST TIME HER BLOOD PRESSURE WAS IN 96 SYSTOLIC, HER LOPRESSOR WAS STOPPED AND SHE WAS GIVEN DIGOXIN AND XARELTO FOR AFIB  Current Outpatient Medications   Medication Sig Dispense Refill    digoxin (LANOXIN) 125 MCG tablet Take 1 tablet by mouth daily 30 tablet 0    rivaroxaban (XARELTO) 15 MG TABS tablet Take 1 tablet by mouth daily (with breakfast) 30 tablet 0    allopurinol (ZYLOPRIM) 100 MG tablet TAKE 1 TABLET BY MOUTH DAILY 30 tablet 0    ipratropium (ATROVENT) 0.03 % nasal spray 2 sprays by Each Nostril route every 12 hours 1 Bottle 3    Blood Pressure Monitor KIT 1 kit by Does not apply route once for 1 dose 1 kit 0     No current facility-administered medications for this visit.       Allergies: Aspirin  Past Medical History:   Diagnosis Date    Acute respiratory failure ([facility] 10/20/2015    After cataract surgery, unable to wean her off    Cataract extraction status of right eye 08/18/2015    Chronic atrial fibrillation ([facility] 3/12/2020    New onset atrial fibriallation Metoprolol ER 25 mg daily Refer to cardiologist        Colonoscopy refused     discussed in 1/16    COPD (chronic obstructive pulmonary disease) ([facility] seen Dr Michal Boxer Depression 2015    resolved    Gout     H/O 24 hour EKG monitoring 2020    Basic rhythm is sinus. One episode of atrial fib RVR Recommend anticoagulation and possible cardioversion.  H/O echocardiogram 10/27/2020    EF 45%. Moderate AR & TR. Mild MR, Moderate Pulmonary HTN.  History of nuclear stress test 10/27/2020    EF 60%. Normal     Mammogram declined     discussed in     Pneumonia     Tobacco abuse disorder      Past Surgical History:   Procedure Laterality Date    CATARACT REMOVAL WITH IMPLANT  10/20/2015    both eyes    DENTAL SURGERY  2016    awaiting full dentures    FRACTURE SURGERY Left ~ -     wrist surgery    HYSTERECTOMY  In her late s     Family History   Problem Relation Age of Onset    Cancer Mother         Stomach CA    Heart Disease Father     Kidney Disease Father         Was on dialysis    Cancer Brother         pancreas    Cancer Brother         pancreas    Diabetes Sister      Social History     Tobacco Use    Smoking status: Current Every Day Smoker     Packs/day: 0.25     Years: 31.00     Pack years: 7.75     Types: Cigarettes     Last attempt to quit: 10/22/2015     Years since quittin.0    Smokeless tobacco: Never Used    Tobacco comment: 2 cigarettes  a day   Substance Use Topics    Alcohol use:  Yes     Alcohol/week: 4.0 - 6.0 standard drinks     Types: 4 - 6 Cans of beer per week     Comment: currently intoxicated      [unfilled]  Review of Systems:   · Constitutional: No Fever or Weight Loss   · Eyes: No Decreased Vision  · ENT: No Headaches, Hearing Loss or Vertigo  · Cardiovascular: No chest pain, dyspnea on exertion, palpitations or loss of consciousness  · Respiratory: No cough or wheezing    · Gastrointestinal: No abdominal pain, appetite loss, blood in stools, constipation, diarrhea or heartburn  · Genitourinary: No dysuria, trouble voiding, or hematuria  · Musculoskeletal:  No gait disturbance, weakness or joint complaints  · Integumentary: No rash or pruritis  · Neurological: No TIA or stroke symptoms  · Psychiatric: No anxiety or depression  · Endocrine: No malaise, fatigue or temperature intolerance  · Hematologic/Lymphatic: No bleeding problems, blood clots or swollen lymph nodes  · Allergic/Immunologic: No nasal congestion or hives  All systems negative except as marked. Objective: Wt Readings from Last 3 Encounters:   04/29/20 149 lb (67.6 kg)   03/24/20 149 lb (67.6 kg)   01/05/17 135 lb 3.2 oz (61.3 kg)     There is no height or weight on file to calculate BMI. PHYSICAL EXAMINATION:  [ INSTRUCTIONS:  \"[x]\" Indicates a positive item  \"[]\" Indicates a negative item  -- DELETE ALL ITEMS NOT EXAMINED]  Vital Signs: (As obtained by patient/caregiver or practitioner observation)    Blood pressure-115/61  Heart rate- 72   Respiratory rate-  14  Temperature-afebrile  Pulse oximetry-     Constitutional: [x] Appears well-developed and well-nourished [x] No apparent distress      [] Abnormal-   Mental status  [x] Alert and awake  [x] Oriented to person/place/time [x]Able to follow commands      Eyes:  EOM    [x]  Normal  [] Abnormal-  Sclera  [x]  Normal  [] Abnormal -         Discharge [x]  None visible  [] Abnormal -    HENT:   [x] Normocephalic, atraumatic.   [] Abnormal   [] Mouth/Throat: Mucous membranes are moist.     External Ears [x] Normal  [] Abnormal-     Neck: [x] No visualized mass     Pulmonary/Chest: [x] Respiratory effort normal.  [x] No visualized signs of difficulty breathing or respiratory distress        [] Abnormal-      Musculoskeletal:   [x] Normal gait with no signs of ataxia         [x] Normal range of motion of neck        [] Abnormal-       Neurological:        [x] No Facial Asymmetry (Cranial nerve 7 motor function) (limited exam to video visit)          [x] No gaze palsy        [] Abnormal-         Skin:        [x] No significant exanthematous lesions or discoloration noted on facial skin         [] Abnormal-            Psychiatric:       [x] Normal Affect [x] No Hallucinations        [] Abnormal-     Other pertinent observable physical exam findings-     No results found for: CKTOTAL, CKMB, CKMBINDEX, TROPONINI  BNP:  No results found for: BNP  PT/INR:  No results found for: PTINR  No results found for: LABA1C  Lab Results   Component Value Date    CHOL 185 06/17/2013    TRIG 110 06/17/2013    HDL 66 06/17/2013    LDLDIRECT 101 (H) 06/17/2013     Lab Results   Component Value Date    ALT 15 03/12/2020    AST 34 03/12/2020     TSH:  No results found for: TSH    Impression:  Lyndon Whiting is a 69 y. o.year old who  has a past medical history of Acute respiratory failure ([facility] [place] [facility] Cataract extraction status of right eye, Chronic atrial fibrillation ([facility] [address] [facility] Colonoscopy refused, COPD (chronic obstructive pulmonary disease) ([facility] [address] [facility] Depression, Gout, H/O 24 hour EKG monitoring, H/O echocardiogram, History of nuclear stress test, Mammogram declined, Pneumonia, and Tobacco abuse disorder. and presents with     Plan:  1. Chronic afib: continue dig and xarelto, she had chem 7 and cbc SOMEWHERE, results are not available, will try to get report  2. COPD\" stable, off atrovent  3. shorntess of breath; as above from COPD,will get echo to check LVEF and RV pressure  1. Health maintenance: exerise and diet  All labs, medications and tests reviewed, continue all other medications of all above medical condition listed as is. Paul Celestin is a 69 y.o. female being evaluated by a Virtual Visit (video visit) encounter to address concerns as mentioned above. A caregiver was present when appropriate. Due to this being a TeleHealth encounter (During [facility] public health emergency), evaluation of the following organ systems was limited: Vitals/Constitutional/EENT/Resp/CV/GI//MS/Neuro/Skin/Heme-Lymph-Imm.   Pursuant to the emergency declaration under the [address] [facility], Lorenza Cooper MD on 11/4/2020 at 4:17 PM    An electronic signature was used to authenticate this note.

## 2020-11-04 NOTE — PROGRESS NOTES
[id number] Score for Atrial Fibrillation Stroke Risk   Risk   Factors  Component Value   C CHF No 0   H HTN Yes 1   A2 Age >= 76 No,  (74 y.o.) 0   D DM No 0   S2 Prior Stroke/TIA No 0   V Vascular Disease No 0   A Age 74-69 Yes,  (74 y.o.) 1   Sc Sex female 1    [id number]  Score  3   Score last updated 11/4/20 0:04 PM EST    Click here for a link to the UpToDate guideline \"Atrial Fibrillation: Anticoagulation therapy to prevent embolization    Disclaimer: Risk Score calculation is dependent on accuracy of patient problem list and past encounter diagnosis.

## 2021-01-01 ENCOUNTER — APPOINTMENT (OUTPATIENT)
Dept: CT IMAGING | Age: 71
End: 2021-01-01
Payer: MEDICARE

## 2021-01-01 ENCOUNTER — APPOINTMENT (OUTPATIENT)
Dept: ULTRASOUND IMAGING | Age: 71
DRG: 292 | End: 2021-01-01
Payer: MEDICARE

## 2021-01-01 ENCOUNTER — APPOINTMENT (OUTPATIENT)
Dept: CT IMAGING | Age: 71
DRG: 292 | End: 2021-01-01
Payer: MEDICARE

## 2021-01-01 ENCOUNTER — APPOINTMENT (OUTPATIENT)
Dept: GENERAL RADIOLOGY | Age: 71
End: 2021-01-01
Payer: MEDICARE

## 2021-01-01 ENCOUNTER — APPOINTMENT (OUTPATIENT)
Dept: GENERAL RADIOLOGY | Age: 71
DRG: 292 | End: 2021-01-01
Payer: MEDICARE

## 2021-01-01 ENCOUNTER — HOSPITAL ENCOUNTER (OUTPATIENT)
Age: 71
Discharge: HOME | End: 2021-07-10
Payer: MEDICARE

## 2021-01-01 ENCOUNTER — HOSPITAL ENCOUNTER (INPATIENT)
Age: 71
LOS: 4 days | Discharge: HOME HEALTH | DRG: 292 | End: 2021-07-07
Attending: EMERGENCY MEDICINE | Admitting: INTERNAL MEDICINE
Payer: MEDICARE

## 2021-01-01 ENCOUNTER — HOSPITAL ENCOUNTER (EMERGENCY)
Age: 71
End: 2021-07-12
Attending: EMERGENCY MEDICINE
Payer: MEDICARE

## 2021-01-01 ENCOUNTER — CARE COORDINATION (OUTPATIENT)
Dept: CASE MANAGEMENT | Age: 71
End: 2021-01-01

## 2021-01-01 VITALS
TEMPERATURE: 94 F | HEART RATE: 117 BPM | WEIGHT: 153 LBS | RESPIRATION RATE: 9 BRPM | HEIGHT: 63 IN | OXYGEN SATURATION: 98 % | BODY MASS INDEX: 27.11 KG/M2 | DIASTOLIC BLOOD PRESSURE: 11 MMHG | SYSTOLIC BLOOD PRESSURE: 71 MMHG

## 2021-01-01 VITALS
SYSTOLIC BLOOD PRESSURE: 120 MMHG | RESPIRATION RATE: 12 BRPM | DIASTOLIC BLOOD PRESSURE: 59 MMHG | OXYGEN SATURATION: 97 % | TEMPERATURE: 97.3 F | HEIGHT: 63 IN | WEIGHT: 153.1 LBS | BODY MASS INDEX: 27.12 KG/M2 | HEART RATE: 84 BPM

## 2021-01-01 DIAGNOSIS — R65.21 SEPSIS WITH ACUTE HYPOXIC RESPIRATORY FAILURE AND SEPTIC SHOCK, DUE TO UNSPECIFIED ORGANISM (HCC): Primary | ICD-10-CM

## 2021-01-01 DIAGNOSIS — I48.91 ATRIAL FIBRILLATION WITH RVR (HCC): ICD-10-CM

## 2021-01-01 DIAGNOSIS — I21.4 NON-STEMI (NON-ST ELEVATED MYOCARDIAL INFARCTION) (HCC): ICD-10-CM

## 2021-01-01 DIAGNOSIS — R60.9 PERIPHERAL EDEMA: ICD-10-CM

## 2021-01-01 DIAGNOSIS — R41.82 ALTERED MENTAL STATUS, UNSPECIFIED ALTERED MENTAL STATUS TYPE: ICD-10-CM

## 2021-01-01 DIAGNOSIS — R06.02 SHORTNESS OF BREATH: ICD-10-CM

## 2021-01-01 DIAGNOSIS — R93.89 ABNORMAL CT OF THE CHEST: ICD-10-CM

## 2021-01-01 DIAGNOSIS — E87.5 HYPERKALEMIA: ICD-10-CM

## 2021-01-01 DIAGNOSIS — I10 HYPERTENSION, ESSENTIAL: ICD-10-CM

## 2021-01-01 DIAGNOSIS — E87.1 ACUTE HYPONATREMIA: ICD-10-CM

## 2021-01-01 DIAGNOSIS — R09.02 HYPOXIA: ICD-10-CM

## 2021-01-01 DIAGNOSIS — A41.9 SEPSIS WITH ACUTE HYPOXIC RESPIRATORY FAILURE AND SEPTIC SHOCK, DUE TO UNSPECIFIED ORGANISM (HCC): Primary | ICD-10-CM
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
## 2021-01-01 DIAGNOSIS — J96.01 SEPSIS WITH ACUTE HYPOXIC RESPIRATORY FAILURE AND SEPTIC SHOCK, DUE TO UNSPECIFIED ORGANISM (HCC): Primary | ICD-10-CM

## 2021-01-01 DIAGNOSIS — E87.1 HYPONATREMIA: ICD-10-CM

## 2021-01-01 DIAGNOSIS — R19.7 DIARRHEA, UNSPECIFIED TYPE: ICD-10-CM

## 2021-01-01 LAB
ALBUMIN SERPL-MCNC: 2.9 GM/DL (ref 3.4–5)
ALBUMIN SERPL-MCNC: 3.1 GM/DL (ref 3.4–5)
ALBUMIN SERPL-MCNC: 3.2 GM/DL (ref 3.4–5)
ALBUMIN SERPL-MCNC: 3.6 GM/DL (ref 3.4–5)
ALP BLD-CCNC: 123 IU/L (ref 40–129)
ALP BLD-CCNC: 133 IU/L (ref 40–129)
ALP BLD-CCNC: 135 IU/L (ref 40–129)
ALP BLD-CCNC: 170 IU/L (ref 40–129)
ALT SERPL-CCNC: 15 U/L (ref 10–40)
ALT SERPL-CCNC: 17 U/L (ref 10–40)
ALT SERPL-CCNC: 19 U/L (ref 10–40)
ALT SERPL-CCNC: 92 U/L (ref 10–40)
AMMONIA: 37 UMOL/L (ref 11–51)
AMYLASE: 13 U/L (ref 25–115)
ANION GAP SERPL CALCULATED.3IONS-SCNC: 11 MMOL/L (ref 4–16)
ANION GAP SERPL CALCULATED.3IONS-SCNC: 2 MMOL/L (ref 4–16)
ANION GAP SERPL CALCULATED.3IONS-SCNC: 2 MMOL/L (ref 4–16)
ANION GAP SERPL CALCULATED.3IONS-SCNC: 29 MMOL/L (ref 4–16)
ANION GAP SERPL CALCULATED.3IONS-SCNC: 3 MMOL/L (ref 4–16)
ANION GAP SERPL CALCULATED.3IONS-SCNC: 3 MMOL/L (ref 4–16)
ANION GAP SERPL CALCULATED.3IONS-SCNC: 5 MMOL/L (ref 4–16)
ANION GAP SERPL CALCULATED.3IONS-SCNC: 6 MMOL/L (ref 4–16)
APTT: 31.7 SECONDS (ref 25.1–37.1)
APTT: 51.7 SECONDS (ref 25.1–37.1)
AST SERPL-CCNC: 248 IU/L (ref 15–37)
AST SERPL-CCNC: 46 IU/L (ref 15–37)
AST SERPL-CCNC: 56 IU/L (ref 15–37)
AST SERPL-CCNC: 80 IU/L (ref 15–37)
BACTERIA: NEGATIVE /HPF
BASE EXCESS MIXED: 1.9 (ref 0–2.3)
BASE EXCESS MIXED: 1.9 (ref 0–2.3)
BASE EXCESS MIXED: 4.6 (ref 0–2.3)
BASE EXCESS: ABNORMAL (ref 0–2.4)
BASOPHILS ABSOLUTE: 0 K/CU MM
BASOPHILS ABSOLUTE: 0.1 K/CU MM
BASOPHILS ABSOLUTE: 0.1 K/CU MM
BASOPHILS RELATIVE PERCENT: 0.4 % (ref 0–1)
BASOPHILS RELATIVE PERCENT: 0.6 % (ref 0–1)
BASOPHILS RELATIVE PERCENT: 0.6 % (ref 0–1)
BASOPHILS RELATIVE PERCENT: 0.7 % (ref 0–1)
BASOPHILS RELATIVE PERCENT: 0.8 % (ref 0–1)
BILIRUB SERPL-MCNC: 1.2 MG/DL (ref 0–1)
BILIRUB SERPL-MCNC: 1.6 MG/DL (ref 0–1)
BILIRUB SERPL-MCNC: 2.6 MG/DL (ref 0–1)
BILIRUB SERPL-MCNC: 3.3 MG/DL (ref 0–1)
BILIRUBIN URINE: NEGATIVE MG/DL
BLOOD, URINE: ABNORMAL
BUN BLDV-MCNC: 13 MG/DL (ref 6–23)
BUN BLDV-MCNC: 15 MG/DL (ref 6–23)
BUN BLDV-MCNC: 20 MG/DL (ref 6–23)
BUN BLDV-MCNC: 24 MG/DL (ref 6–23)
BUN BLDV-MCNC: 32 MG/DL (ref 6–23)
BUN BLDV-MCNC: 32 MG/DL (ref 6–23)
BUN BLDV-MCNC: 53 MG/DL (ref 6–23)
BUN BLDV-MCNC: 95 MG/DL (ref 6–23)
CALCIUM IONIZED: 4.84 MG/DL (ref 4.48–5.28)
CALCIUM SERPL-MCNC: 10.5 MG/DL (ref 8.3–10.6)
CALCIUM SERPL-MCNC: 10.8 MG/DL (ref 8.3–10.6)
CALCIUM SERPL-MCNC: 8.7 MG/DL (ref 8.3–10.6)
CALCIUM SERPL-MCNC: 8.7 MG/DL (ref 8.3–10.6)
CALCIUM SERPL-MCNC: 8.9 MG/DL (ref 8.3–10.6)
CALCIUM SERPL-MCNC: 9.1 MG/DL (ref 8.3–10.6)
CALCIUM SERPL-MCNC: 9.4 MG/DL (ref 8.3–10.6)
CALCIUM SERPL-MCNC: 9.6 MG/DL (ref 8.3–10.6)
CAST TYPE: ABNORMAL /HPF
CHLORIDE BLD-SCNC: 79 MMOL/L (ref 99–110)
CHLORIDE BLD-SCNC: 79 MMOL/L (ref 99–110)
CHLORIDE BLD-SCNC: 81 MMOL/L (ref 99–110)
CHLORIDE BLD-SCNC: 81 MMOL/L (ref 99–110)
CHLORIDE BLD-SCNC: 83 MMOL/L (ref 99–110)
CHLORIDE BLD-SCNC: 87 MMOL/L (ref 99–110)
CHLORIDE BLD-SCNC: 88 MMOL/L (ref 99–110)
CHLORIDE BLD-SCNC: 88 MMOL/L (ref 99–110)
CHLORIDE URINE RANDOM: 51 MMOL/L (ref 43–210)
CHOLESTEROL: 111 MG/DL
CLARITY: ABNORMAL
CO2 CONTENT: 26.7 MMOL/L (ref 19–24)
CO2 CONTENT: 28.9 MMOL/L (ref 19–24)
CO2 CONTENT: 29.3 MMOL/L (ref 19–24)
CO2: 25 MMOL/L (ref 21–32)
CO2: 25 MMOL/L (ref 21–32)
CO2: 29 MMOL/L (ref 21–32)
CO2: 34 MMOL/L (ref 21–32)
CO2: 40 MMOL/L (ref 21–32)
CO2: 48 MMOL/L (ref 21–32)
CO2: 49 MMOL/L (ref 21–32)
CO2: 49 MMOL/L (ref 21–32)
COLOR: YELLOW
CREAT SERPL-MCNC: 0.6 MG/DL (ref 0.6–1.1)
CREAT SERPL-MCNC: 0.6 MG/DL (ref 0.6–1.1)
CREAT SERPL-MCNC: 0.9 MG/DL (ref 0.6–1.1)
CREAT SERPL-MCNC: 1 MG/DL (ref 0.6–1.1)
CREAT SERPL-MCNC: 1 MG/DL (ref 0.6–1.1)
CREAT SERPL-MCNC: 1.1 MG/DL (ref 0.6–1.1)
CREAT SERPL-MCNC: 1.2 MG/DL (ref 0.6–1.1)
CREAT SERPL-MCNC: 2.5 MG/DL (ref 0.6–1.1)
CREATININE URINE: 66.8 MG/DL
CRYSTAL TYPE: ABNORMAL /HPF
DIFFERENTIAL TYPE: ABNORMAL
DIGOXIN LEVEL: 0.6 NG/ML (ref 0.8–2)
DOSE AMOUNT: ABNORMAL
DOSE TIME: ABNORMAL
EKG ATRIAL RATE: 133 BPM
EKG DIAGNOSIS: NORMAL
EKG DIAGNOSIS: NORMAL
EKG Q-T INTERVAL: 336 MS
EKG Q-T INTERVAL: 374 MS
EKG QRS DURATION: 74 MS
EKG QRS DURATION: 92 MS
EKG QTC CALCULATION (BAZETT): 436 MS
EKG QTC CALCULATION (BAZETT): 448 MS
EKG R AXIS: 15 DEGREES
EKG R AXIS: 22 DEGREES
EKG T AXIS: 255 DEGREES
EKG T AXIS: 87 DEGREES
EKG VENTRICULAR RATE: 107 BPM
EKG VENTRICULAR RATE: 82 BPM
EOSINOPHILS ABSOLUTE: 0 K/CU MM
EOSINOPHILS ABSOLUTE: 0 K/CU MM
EOSINOPHILS ABSOLUTE: 0.1 K/CU MM
EOSINOPHILS RELATIVE PERCENT: 0.1 % (ref 0–3)
EOSINOPHILS RELATIVE PERCENT: 0.3 % (ref 0–3)
EOSINOPHILS RELATIVE PERCENT: 1.6 % (ref 0–3)
EOSINOPHILS RELATIVE PERCENT: 1.6 % (ref 0–3)
EOSINOPHILS RELATIVE PERCENT: 1.9 % (ref 0–3)
EPITHELIAL CELLS, UA: ABNORMAL /HPF
GFR AFRICAN AMERICAN: 23 ML/MIN/1.73M2
GFR AFRICAN AMERICAN: 54 ML/MIN/1.73M2
GFR AFRICAN AMERICAN: 59 ML/MIN/1.73M2
GFR AFRICAN AMERICAN: >60 ML/MIN/1.73M2
GFR NON-AFRICAN AMERICAN: 19 ML/MIN/1.73M2
GFR NON-AFRICAN AMERICAN: 44 ML/MIN/1.73M2
GFR NON-AFRICAN AMERICAN: 49 ML/MIN/1.73M2
GFR NON-AFRICAN AMERICAN: 55 ML/MIN/1.73M2
GFR NON-AFRICAN AMERICAN: 55 ML/MIN/1.73M2
GFR NON-AFRICAN AMERICAN: >60 ML/MIN/1.73M2
GLUCOSE BLD-MCNC: 100 MG/DL (ref 70–99)
GLUCOSE BLD-MCNC: 104 MG/DL (ref 70–99)
GLUCOSE BLD-MCNC: 117 MG/DL (ref 70–99)
GLUCOSE BLD-MCNC: 135 MG/DL (ref 70–99)
GLUCOSE BLD-MCNC: 84 MG/DL (ref 70–99)
GLUCOSE BLD-MCNC: 85 MG/DL (ref 70–99)
GLUCOSE BLD-MCNC: 94 MG/DL (ref 70–99)
GLUCOSE BLD-MCNC: 96 MG/DL (ref 70–99)
GLUCOSE, URINE: NEGATIVE MG/DL
HAV IGM SER IA-ACNC: NON REACTIVE
HCO3 VENOUS: 25.1 MMOL/L (ref 19–25)
HCO3 VENOUS: 27.7 MMOL/L (ref 19–25)
HCO3 VENOUS: 27.8 MMOL/L (ref 19–25)
HCT VFR BLD CALC: 29.8 % (ref 37–47)
HCT VFR BLD CALC: 31 % (ref 37–47)
HCT VFR BLD CALC: 32.6 % (ref 37–47)
HCT VFR BLD CALC: 32.9 % (ref 37–47)
HCT VFR BLD CALC: 35.5 % (ref 37–47)
HCT VFR BLD CALC: 39.1 % (ref 37–47)
HDLC SERPL-MCNC: 45 MG/DL
HEMOGLOBIN: 10.3 GM/DL (ref 12.5–16)
HEMOGLOBIN: 11 GM/DL (ref 12.5–16)
HEMOGLOBIN: 11.1 GM/DL (ref 12.5–16)
HEMOGLOBIN: 12 GM/DL (ref 12.5–16)
HEMOGLOBIN: 13.2 GM/DL (ref 12.5–16)
HEMOGLOBIN: 9.4 GM/DL (ref 12.5–16)
HEPATITIS B CORE IGM ANTIBODY: NON REACTIVE
HEPATITIS B SURFACE ANTIGEN: NON REACTIVE
HEPATITIS C ANTIBODY: NON REACTIVE
IMMATURE NEUTROPHIL %: 0.5 % (ref 0–0.43)
IMMATURE NEUTROPHIL %: 0.6 % (ref 0–0.43)
IMMATURE NEUTROPHIL %: 0.6 % (ref 0–0.43)
IMMATURE NEUTROPHIL %: 0.7 % (ref 0–0.43)
IMMATURE NEUTROPHIL %: 0.9 % (ref 0–0.43)
INR BLD: 1.64 INDEX
INR BLD: 1.67 INDEX
INR BLD: 4.64 INDEX
IONIZED CA: 1.21 MMOL/L (ref 1.12–1.32)
KETONES, URINE: NEGATIVE MG/DL
LACTATE: NORMAL MMOL/L (ref 0.4–2)
LACTIC ACID, SEPSIS: 15.2 MMOL/L (ref 0.5–1.9)
LDL CHOLESTEROL DIRECT: 54 MG/DL
LEUKOCYTE ESTERASE, URINE: ABNORMAL
LIPASE: 19 IU/L (ref 13–60)
LV EF: 53 %
LVEF MODALITY: NORMAL
LYMPHOCYTES ABSOLUTE: 0.7 K/CU MM
LYMPHOCYTES ABSOLUTE: 0.9 K/CU MM
LYMPHOCYTES ABSOLUTE: 1.1 K/CU MM
LYMPHOCYTES ABSOLUTE: 1.5 K/CU MM
LYMPHOCYTES ABSOLUTE: 1.6 K/CU MM
LYMPHOCYTES RELATIVE PERCENT: 12.7 % (ref 24–44)
LYMPHOCYTES RELATIVE PERCENT: 16.8 % (ref 24–44)
LYMPHOCYTES RELATIVE PERCENT: 24.6 % (ref 24–44)
LYMPHOCYTES RELATIVE PERCENT: 28.3 % (ref 24–44)
LYMPHOCYTES RELATIVE PERCENT: 8.1 % (ref 24–44)
MAGNESIUM: 1.6 MG/DL (ref 1.8–2.4)
MAGNESIUM: 1.6 MG/DL (ref 1.8–2.4)
MAGNESIUM: 1.7 MG/DL (ref 1.8–2.4)
MAGNESIUM: 1.7 MG/DL (ref 1.8–2.4)
MAGNESIUM: 1.8 MG/DL (ref 1.8–2.4)
MCH RBC QN AUTO: 32.9 PG (ref 27–31)
MCH RBC QN AUTO: 32.9 PG (ref 27–31)
MCH RBC QN AUTO: 33 PG (ref 27–31)
MCH RBC QN AUTO: 33.1 PG (ref 27–31)
MCH RBC QN AUTO: 33.2 PG (ref 27–31)
MCH RBC QN AUTO: 34.3 PG (ref 27–31)
MCHC RBC AUTO-ENTMCNC: 31.5 % (ref 32–36)
MCHC RBC AUTO-ENTMCNC: 33.2 % (ref 32–36)
MCHC RBC AUTO-ENTMCNC: 33.7 % (ref 32–36)
MCHC RBC AUTO-ENTMCNC: 33.7 % (ref 32–36)
MCHC RBC AUTO-ENTMCNC: 33.8 % (ref 32–36)
MCHC RBC AUTO-ENTMCNC: 33.8 % (ref 32–36)
MCV RBC AUTO: 108.8 FL (ref 78–100)
MCV RBC AUTO: 97.5 FL (ref 78–100)
MCV RBC AUTO: 97.6 FL (ref 78–100)
MCV RBC AUTO: 97.9 FL (ref 78–100)
MCV RBC AUTO: 98.3 FL (ref 78–100)
MCV RBC AUTO: 99.7 FL (ref 78–100)
MONOCYTES ABSOLUTE: 0.7 K/CU MM
MONOCYTES ABSOLUTE: 0.7 K/CU MM
MONOCYTES ABSOLUTE: 0.8 K/CU MM
MONOCYTES RELATIVE PERCENT: 10 % (ref 0–4)
MONOCYTES RELATIVE PERCENT: 10.8 % (ref 0–4)
MONOCYTES RELATIVE PERCENT: 11.6 % (ref 0–4)
MONOCYTES RELATIVE PERCENT: 12.1 % (ref 0–4)
MONOCYTES RELATIVE PERCENT: 13.7 % (ref 0–4)
MUCUS: ABNORMAL HPF
NITRITE URINE, QUANTITATIVE: NEGATIVE
O2 SAT, VEN: 70 % (ref 50–70)
O2 SAT, VEN: 80.4 % (ref 50–70)
O2 SAT, VEN: 98.8 % (ref 50–70)
OSMOLALITY URINE: 449 MOSM/KG (ref 50–800)
OSMOLALITY: 257 MOSM/KG (ref 280–303)
PCO2, VEN: 34.9 MMHG (ref 38–52)
PCO2, VEN: 50.2 MMHG (ref 38–52)
PCO2, VEN: 50.4 MMHG (ref 38–52)
PDW BLD-RTO: 13.6 % (ref 11.7–14.9)
PDW BLD-RTO: 13.7 % (ref 11.7–14.9)
PDW BLD-RTO: 13.8 % (ref 11.7–14.9)
PDW BLD-RTO: 13.8 % (ref 11.7–14.9)
PDW BLD-RTO: 13.9 % (ref 11.7–14.9)
PDW BLD-RTO: 15.4 % (ref 11.7–14.9)
PH VENOUS: 7.31 (ref 7.32–7.42)
PH VENOUS: 7.35 (ref 7.32–7.42)
PH VENOUS: 7.51 (ref 7.32–7.42)
PH, URINE: 6 (ref 5–8)
PHOSPHORUS: 3 MG/DL (ref 2.5–4.9)
PLATELET # BLD: 100 K/CU MM (ref 140–440)
PLATELET # BLD: 256 K/CU MM (ref 140–440)
PLATELET # BLD: 65 K/CU MM (ref 140–440)
PLATELET # BLD: 92 K/CU MM (ref 140–440)
PLATELET # BLD: 95 K/CU MM (ref 140–440)
PLATELET # BLD: 99 K/CU MM (ref 140–440)
PMV BLD AUTO: 10.1 FL (ref 7.5–11.1)
PMV BLD AUTO: 10.1 FL (ref 7.5–11.1)
PMV BLD AUTO: 10.4 FL (ref 7.5–11.1)
PMV BLD AUTO: 11.4 FL (ref 7.5–11.1)
PMV BLD AUTO: 9.9 FL (ref 7.5–11.1)
PMV BLD AUTO: 9.9 FL (ref 7.5–11.1)
PO2, VEN: 112 MMHG (ref 28–48)
PO2, VEN: 40.6 MMHG (ref 28–48)
PO2, VEN: 47.6 MMHG (ref 28–48)
POTASSIUM SERPL-SCNC: 3 MMOL/L (ref 3.5–5.1)
POTASSIUM SERPL-SCNC: 3.3 MMOL/L (ref 3.5–5.1)
POTASSIUM SERPL-SCNC: 3.4 MMOL/L (ref 3.5–5.1)
POTASSIUM SERPL-SCNC: 3.7 MMOL/L (ref 3.5–5.1)
POTASSIUM SERPL-SCNC: 4.7 MMOL/L (ref 3.5–5.1)
POTASSIUM SERPL-SCNC: 4.9 MMOL/L (ref 3.5–5.1)
POTASSIUM SERPL-SCNC: 5 MMOL/L (ref 3.5–5.1)
POTASSIUM SERPL-SCNC: 7.1 MMOL/L (ref 3.5–5.1)
POTASSIUM, UR: 23.6 MMOL/L (ref 22–119)
PRO-BNP: 1827 PG/ML
PRO-BNP: 2080 PG/ML
PRO-BNP: 3405 PG/ML
PROTEIN UA: NEGATIVE MG/DL
PROTHROMBIN TIME: 20.6 SECONDS (ref 11.7–14.5)
PROTHROMBIN TIME: 21 SECONDS (ref 11.7–14.5)
PROTHROMBIN TIME: 58.9 SECONDS (ref 11.7–14.5)
RBC # BLD: 2.74 M/CU MM (ref 4.2–5.4)
RBC # BLD: 3.11 M/CU MM (ref 4.2–5.4)
RBC # BLD: 3.33 M/CU MM (ref 4.2–5.4)
RBC # BLD: 3.37 M/CU MM (ref 4.2–5.4)
RBC # BLD: 3.61 M/CU MM (ref 4.2–5.4)
RBC # BLD: 4.01 M/CU MM (ref 4.2–5.4)
RBC URINE: ABNORMAL /HPF (ref 0–6)
SARS-COV-2, NAAT: NOT DETECTED
SEGMENTED NEUTROPHILS ABSOLUTE COUNT: 2.9 K/CU MM
SEGMENTED NEUTROPHILS ABSOLUTE COUNT: 3.9 K/CU MM
SEGMENTED NEUTROPHILS ABSOLUTE COUNT: 4.6 K/CU MM
SEGMENTED NEUTROPHILS ABSOLUTE COUNT: 5.4 K/CU MM
SEGMENTED NEUTROPHILS ABSOLUTE COUNT: 6.8 K/CU MM
SEGMENTED NEUTROPHILS RELATIVE PERCENT: 54.5 % (ref 36–66)
SEGMENTED NEUTROPHILS RELATIVE PERCENT: 60.9 % (ref 36–66)
SEGMENTED NEUTROPHILS RELATIVE PERCENT: 68.3 % (ref 36–66)
SEGMENTED NEUTROPHILS RELATIVE PERCENT: 75.1 % (ref 36–66)
SEGMENTED NEUTROPHILS RELATIVE PERCENT: 80.6 % (ref 36–66)
SODIUM BLD-SCNC: 123 MMOL/L (ref 135–145)
SODIUM BLD-SCNC: 124 MMOL/L (ref 135–145)
SODIUM BLD-SCNC: 124 MMOL/L (ref 135–145)
SODIUM BLD-SCNC: 126 MMOL/L (ref 135–145)
SODIUM BLD-SCNC: 130 MMOL/L (ref 135–145)
SODIUM BLD-SCNC: 131 MMOL/L (ref 135–145)
SODIUM BLD-SCNC: 133 MMOL/L (ref 135–145)
SODIUM BLD-SCNC: 135 MMOL/L (ref 135–145)
SODIUM URINE: 30 MMOL/L (ref 35–167)
SODIUM URINE: 32 MMOL/L (ref 35–167)
SOURCE, BLOOD GAS: ABNORMAL
SOURCE: NORMAL
SPECIFIC GRAVITY UA: 1.02 (ref 1–1.03)
T4 FREE: 1.52 NG/DL (ref 0.9–1.8)
TOTAL CK: 131 IU/L (ref 26–140)
TOTAL CK: 556 IU/L (ref 26–140)
TOTAL IMMATURE NEUTOROPHIL: 0.03 K/CU MM
TOTAL IMMATURE NEUTOROPHIL: 0.04 K/CU MM
TOTAL IMMATURE NEUTOROPHIL: 0.05 K/CU MM
TOTAL PROTEIN: 6 GM/DL (ref 6.4–8.2)
TOTAL PROTEIN: 6.3 GM/DL (ref 6.4–8.2)
TOTAL PROTEIN: 6.8 GM/DL (ref 6.4–8.2)
TOTAL PROTEIN: 7.8 GM/DL (ref 6.4–8.2)
TRIGL SERPL-MCNC: 69 MG/DL
TROPONIN T: 0.07 NG/ML
TROPONIN T: <0.01 NG/ML
TSH HIGH SENSITIVITY: 6.8 UIU/ML (ref 0.27–4.2)
UROBILINOGEN, URINE: 1 MG/DL (ref 0.2–1)
VITAMIN B-12: 1536 PG/ML (ref 211–911)
VOLUME, (UVOL): 12 ML (ref 10–12)
WBC # BLD: 33.7 K/CU MM (ref 4–10.5)
WBC # BLD: 5.4 K/CU MM (ref 4–10.5)
WBC # BLD: 6.4 K/CU MM (ref 4–10.5)
WBC # BLD: 6.7 K/CU MM (ref 4–10.5)
WBC # BLD: 7.1 K/CU MM (ref 4–10.5)
WBC # BLD: 8.4 K/CU MM (ref 4–10.5)
WBC UA: ABNORMAL /HPF (ref 0–5)

## 2021-01-01 PROCEDURE — 80048 BASIC METABOLIC PNL TOTAL CA: CPT

## 2021-01-01 PROCEDURE — 84300 ASSAY OF URINE SODIUM: CPT

## 2021-01-01 PROCEDURE — 82436 ASSAY OF URINE CHLORIDE: CPT

## 2021-01-01 PROCEDURE — 93971 EXTREMITY STUDY: CPT

## 2021-01-01 PROCEDURE — 2580000003 HC RX 258: Performed by: EMERGENCY MEDICINE

## 2021-01-01 PROCEDURE — 87040 BLOOD CULTURE FOR BACTERIA: CPT

## 2021-01-01 PROCEDURE — 82140 ASSAY OF AMMONIA: CPT

## 2021-01-01 PROCEDURE — 70496 CT ANGIOGRAPHY HEAD: CPT

## 2021-01-01 PROCEDURE — 82607 VITAMIN B-12: CPT

## 2021-01-01 PROCEDURE — 2580000003 HC RX 258: Performed by: PHYSICIAN ASSISTANT

## 2021-01-01 PROCEDURE — 2700000000 HC OXYGEN THERAPY PER DAY

## 2021-01-01 PROCEDURE — 85027 COMPLETE CBC AUTOMATED: CPT

## 2021-01-01 PROCEDURE — 36415 COLL VENOUS BLD VENIPUNCTURE: CPT

## 2021-01-01 PROCEDURE — 51702 INSERT TEMP BLADDER CATH: CPT

## 2021-01-01 PROCEDURE — 1200000000 HC SEMI PRIVATE

## 2021-01-01 PROCEDURE — 6370000000 HC RX 637 (ALT 250 FOR IP): Performed by: INTERNAL MEDICINE

## 2021-01-01 PROCEDURE — 83935 ASSAY OF URINE OSMOLALITY: CPT

## 2021-01-01 PROCEDURE — 2500000003 HC RX 250 WO HCPCS: Performed by: EMERGENCY MEDICINE

## 2021-01-01 PROCEDURE — 71045 X-RAY EXAM CHEST 1 VIEW: CPT

## 2021-01-01 PROCEDURE — 84436 ASSAY OF TOTAL THYROXINE: CPT

## 2021-01-01 PROCEDURE — 6360000004 HC RX CONTRAST MEDICATION: Performed by: EMERGENCY MEDICINE

## 2021-01-01 PROCEDURE — 2500000003 HC RX 250 WO HCPCS: Performed by: INTERNAL MEDICINE

## 2021-01-01 PROCEDURE — 97166 OT EVAL MOD COMPLEX 45 MIN: CPT

## 2021-01-01 PROCEDURE — 80053 COMPREHEN METABOLIC PANEL: CPT

## 2021-01-01 PROCEDURE — 83735 ASSAY OF MAGNESIUM: CPT

## 2021-01-01 PROCEDURE — 99222 1ST HOSP IP/OBS MODERATE 55: CPT | Performed by: INTERNAL MEDICINE

## 2021-01-01 PROCEDURE — 6360000002 HC RX W HCPCS: Performed by: EMERGENCY MEDICINE

## 2021-01-01 PROCEDURE — 99291 CRITICAL CARE FIRST HOUR: CPT

## 2021-01-01 PROCEDURE — 84484 ASSAY OF TROPONIN QUANT: CPT

## 2021-01-01 PROCEDURE — 6370000000 HC RX 637 (ALT 250 FOR IP): Performed by: HOSPITALIST

## 2021-01-01 PROCEDURE — 73130 X-RAY EXAM OF HAND: CPT

## 2021-01-01 PROCEDURE — 82330 ASSAY OF CALCIUM: CPT

## 2021-01-01 PROCEDURE — 99211 OFF/OP EST MAY X REQ PHY/QHP: CPT

## 2021-01-01 PROCEDURE — 82800 BLOOD PH: CPT

## 2021-01-01 PROCEDURE — 70450 CT HEAD/BRAIN W/O DYE: CPT

## 2021-01-01 PROCEDURE — 97535 SELF CARE MNGMENT TRAINING: CPT

## 2021-01-01 PROCEDURE — 94761 N-INVAS EAR/PLS OXIMETRY MLT: CPT

## 2021-01-01 PROCEDURE — 80074 ACUTE HEPATITIS PANEL: CPT

## 2021-01-01 PROCEDURE — 36556 INSERT NON-TUNNEL CV CATH: CPT

## 2021-01-01 PROCEDURE — 81001 URINALYSIS AUTO W/SCOPE: CPT

## 2021-01-01 PROCEDURE — 93005 ELECTROCARDIOGRAM TRACING: CPT | Performed by: EMERGENCY MEDICINE

## 2021-01-01 PROCEDURE — 93010 ELECTROCARDIOGRAM REPORT: CPT | Performed by: INTERNAL MEDICINE

## 2021-01-01 PROCEDURE — 2580000003 HC RX 258: Performed by: NURSE PRACTITIONER

## 2021-01-01 PROCEDURE — 97530 THERAPEUTIC ACTIVITIES: CPT

## 2021-01-01 PROCEDURE — 6370000000 HC RX 637 (ALT 250 FOR IP): Performed by: NURSE PRACTITIONER

## 2021-01-01 PROCEDURE — 80061 LIPID PANEL: CPT

## 2021-01-01 PROCEDURE — 84100 ASSAY OF PHOSPHORUS: CPT

## 2021-01-01 PROCEDURE — 80162 ASSAY OF DIGOXIN TOTAL: CPT

## 2021-01-01 PROCEDURE — 94640 AIRWAY INHALATION TREATMENT: CPT

## 2021-01-01 PROCEDURE — 73090 X-RAY EXAM OF FOREARM: CPT

## 2021-01-01 PROCEDURE — 84439 ASSAY OF FREE THYROXINE: CPT

## 2021-01-01 PROCEDURE — 85610 PROTHROMBIN TIME: CPT

## 2021-01-01 PROCEDURE — 2140000000 HC CCU INTERMEDIATE R&B

## 2021-01-01 PROCEDURE — 94760 N-INVAS EAR/PLS OXIMETRY 1: CPT

## 2021-01-01 PROCEDURE — 96365 THER/PROPH/DIAG IV INF INIT: CPT

## 2021-01-01 PROCEDURE — 99233 SBSQ HOSP IP/OBS HIGH 50: CPT | Performed by: INTERNAL MEDICINE

## 2021-01-01 PROCEDURE — 99285 EMERGENCY DEPT VISIT HI MDM: CPT

## 2021-01-01 PROCEDURE — 2580000003 HC RX 258: Performed by: INTERNAL MEDICINE

## 2021-01-01 PROCEDURE — 85025 COMPLETE CBC W/AUTO DIFF WBC: CPT

## 2021-01-01 PROCEDURE — 72125 CT NECK SPINE W/O DYE: CPT

## 2021-01-01 PROCEDURE — 84443 ASSAY THYROID STIM HORMONE: CPT

## 2021-01-01 PROCEDURE — 82550 ASSAY OF CK (CPK): CPT

## 2021-01-01 PROCEDURE — 83930 ASSAY OF BLOOD OSMOLALITY: CPT

## 2021-01-01 PROCEDURE — 96360 HYDRATION IV INFUSION INIT: CPT

## 2021-01-01 PROCEDURE — 83880 ASSAY OF NATRIURETIC PEPTIDE: CPT

## 2021-01-01 PROCEDURE — 6360000002 HC RX W HCPCS: Performed by: NURSE PRACTITIONER

## 2021-01-01 PROCEDURE — 83721 ASSAY OF BLOOD LIPOPROTEIN: CPT

## 2021-01-01 PROCEDURE — 97116 GAIT TRAINING THERAPY: CPT

## 2021-01-01 PROCEDURE — 2500000003 HC RX 250 WO HCPCS

## 2021-01-01 PROCEDURE — 85730 THROMBOPLASTIN TIME PARTIAL: CPT

## 2021-01-01 PROCEDURE — 73110 X-RAY EXAM OF WRIST: CPT

## 2021-01-01 PROCEDURE — 31500 INSERT EMERGENCY AIRWAY: CPT

## 2021-01-01 PROCEDURE — 82570 ASSAY OF URINE CREATININE: CPT

## 2021-01-01 PROCEDURE — 83605 ASSAY OF LACTIC ACID: CPT

## 2021-01-01 PROCEDURE — 84133 ASSAY OF URINE POTASSIUM: CPT

## 2021-01-01 PROCEDURE — 76705 ECHO EXAM OF ABDOMEN: CPT

## 2021-01-01 PROCEDURE — 93306 TTE W/DOPPLER COMPLETE: CPT

## 2021-01-01 PROCEDURE — 87635 SARS-COV-2 COVID-19 AMP PRB: CPT

## 2021-01-01 PROCEDURE — 83690 ASSAY OF LIPASE: CPT

## 2021-01-01 PROCEDURE — 6360000002 HC RX W HCPCS: Performed by: INTERNAL MEDICINE

## 2021-01-01 PROCEDURE — 6370000000 HC RX 637 (ALT 250 FOR IP)

## 2021-01-01 PROCEDURE — 71275 CT ANGIOGRAPHY CHEST: CPT

## 2021-01-01 PROCEDURE — 6360000002 HC RX W HCPCS

## 2021-01-01 PROCEDURE — 82150 ASSAY OF AMYLASE: CPT

## 2021-01-01 PROCEDURE — 96368 THER/DIAG CONCURRENT INF: CPT

## 2021-01-01 PROCEDURE — 97162 PT EVAL MOD COMPLEX 30 MIN: CPT

## 2021-01-01 RX ORDER — SODIUM CHLORIDE 0.9 % (FLUSH) 0.9 %
5-40 SYRINGE (ML) INJECTION EVERY 12 HOURS SCHEDULED
Status: DISCONTINUED | OUTPATIENT
Start: 2021-01-01 | End: 2021-01-01 | Stop reason: HOSPADM

## 2021-01-01 RX ORDER — ONDANSETRON 2 MG/ML
4 INJECTION INTRAMUSCULAR; INTRAVENOUS EVERY 30 MIN PRN
Status: DISCONTINUED | OUTPATIENT
Start: 2021-01-01 | End: 2021-01-01 | Stop reason: HOSPADM

## 2021-01-01 RX ORDER — ALLOPURINOL 100 MG/1
100 TABLET ORAL DAILY
Status: DISCONTINUED | OUTPATIENT
Start: 2021-01-01 | End: 2021-01-01 | Stop reason: HOSPADM

## 2021-01-01 RX ORDER — FUROSEMIDE 10 MG/ML
20 INJECTION INTRAMUSCULAR; INTRAVENOUS 2 TIMES DAILY
Status: DISCONTINUED | OUTPATIENT
Start: 2021-01-01 | End: 2021-01-01

## 2021-01-01 RX ORDER — 0.9 % SODIUM CHLORIDE 0.9 %
250 INTRAVENOUS SOLUTION INTRAVENOUS ONCE
Status: COMPLETED | OUTPATIENT
Start: 2021-01-01 | End: 2021-01-01

## 2021-01-01 RX ORDER — IPRATROPIUM BROMIDE AND ALBUTEROL SULFATE 2.5; .5 MG/3ML; MG/3ML
1 SOLUTION RESPIRATORY (INHALATION)
Status: DISCONTINUED | OUTPATIENT
Start: 2021-01-01 | End: 2021-01-01 | Stop reason: HOSPADM

## 2021-01-01 RX ORDER — SPIRONOLACTONE 25 MG/1
25 TABLET ORAL DAILY
Status: DISCONTINUED | OUTPATIENT
Start: 2021-01-01 | End: 2021-01-01 | Stop reason: HOSPADM

## 2021-01-01 RX ORDER — LORAZEPAM 2 MG/ML
4 INJECTION INTRAMUSCULAR
Status: DISCONTINUED | OUTPATIENT
Start: 2021-01-01 | End: 2021-01-01 | Stop reason: HOSPADM

## 2021-01-01 RX ORDER — CALCIUM GLUCONATE 94 MG/ML
1000 INJECTION, SOLUTION INTRAVENOUS ONCE
Status: DISCONTINUED | OUTPATIENT
Start: 2021-01-01 | End: 2021-01-01

## 2021-01-01 RX ORDER — BUMETANIDE 1 MG/1
2 TABLET ORAL DAILY
Status: DISCONTINUED | OUTPATIENT
Start: 2021-01-01 | End: 2021-01-01 | Stop reason: HOSPADM

## 2021-01-01 RX ORDER — IPRATROPIUM BROMIDE 21 UG/1
2 SPRAY, METERED NASAL EVERY 12 HOURS
Status: DISCONTINUED | OUTPATIENT
Start: 2021-01-01 | End: 2021-01-01 | Stop reason: HOSPADM

## 2021-01-01 RX ORDER — POTASSIUM CHLORIDE 20 MEQ/1
40 TABLET, EXTENDED RELEASE ORAL
Status: DISCONTINUED | OUTPATIENT
Start: 2021-01-01 | End: 2021-01-01 | Stop reason: HOSPADM

## 2021-01-01 RX ORDER — MIDODRINE HYDROCHLORIDE 5 MG/1
5 TABLET ORAL
Qty: 42 TABLET | Refills: 0 | Status: SHIPPED | OUTPATIENT
Start: 2021-01-01

## 2021-01-01 RX ORDER — IPRATROPIUM BROMIDE AND ALBUTEROL SULFATE 2.5; .5 MG/3ML; MG/3ML
SOLUTION RESPIRATORY (INHALATION)
Status: COMPLETED
Start: 2021-01-01 | End: 2021-01-01

## 2021-01-01 RX ORDER — NITROGLYCERIN 0.4 MG/1
0.4 TABLET SUBLINGUAL EVERY 5 MIN PRN
Status: DISCONTINUED | OUTPATIENT
Start: 2021-01-01 | End: 2021-01-01 | Stop reason: HOSPADM

## 2021-01-01 RX ORDER — BUMETANIDE 0.25 MG/ML
1 INJECTION, SOLUTION INTRAMUSCULAR; INTRAVENOUS 2 TIMES DAILY
Status: DISCONTINUED | OUTPATIENT
Start: 2021-01-01 | End: 2021-01-01 | Stop reason: CLARIF

## 2021-01-01 RX ORDER — THIAMINE MONONITRATE (VIT B1) 100 MG
100 TABLET ORAL DAILY
Qty: 30 TABLET | Refills: 0 | Status: SHIPPED | OUTPATIENT
Start: 2021-01-01

## 2021-01-01 RX ORDER — SODIUM CHLORIDE 9 MG/ML
INJECTION, SOLUTION INTRAVENOUS CONTINUOUS
Status: DISCONTINUED | OUTPATIENT
Start: 2021-01-01 | End: 2021-01-01 | Stop reason: HOSPADM

## 2021-01-01 RX ORDER — BUMETANIDE 2 MG/1
2 TABLET ORAL DAILY
Qty: 30 TABLET | Refills: 0 | Status: SHIPPED | OUTPATIENT
Start: 2021-01-01

## 2021-01-01 RX ORDER — MORPHINE SULFATE 4 MG/ML
4 INJECTION, SOLUTION INTRAMUSCULAR; INTRAVENOUS EVERY 30 MIN PRN
Status: DISCONTINUED | OUTPATIENT
Start: 2021-01-01 | End: 2021-01-01 | Stop reason: HOSPADM

## 2021-01-01 RX ORDER — M-VIT,TX,IRON,MINS/CALC/FOLIC 27MG-0.4MG
1 TABLET ORAL DAILY
Status: DISCONTINUED | OUTPATIENT
Start: 2021-01-01 | End: 2021-01-01 | Stop reason: HOSPADM

## 2021-01-01 RX ORDER — MIDODRINE HYDROCHLORIDE 5 MG/1
5 TABLET ORAL
Status: DISCONTINUED | OUTPATIENT
Start: 2021-01-01 | End: 2021-01-01 | Stop reason: HOSPADM

## 2021-01-01 RX ORDER — LORAZEPAM 1 MG/1
4 TABLET ORAL
Status: DISCONTINUED | OUTPATIENT
Start: 2021-01-01 | End: 2021-01-01 | Stop reason: HOSPADM

## 2021-01-01 RX ORDER — ALLOPURINOL 100 MG/1
100 TABLET ORAL DAILY
Qty: 30 TABLET | Refills: 0 | Status: SHIPPED | OUTPATIENT
Start: 2021-01-01

## 2021-01-01 RX ORDER — POTASSIUM CHLORIDE 20 MEQ/1
40 TABLET, EXTENDED RELEASE ORAL ONCE
Status: COMPLETED | OUTPATIENT
Start: 2021-01-01 | End: 2021-01-01

## 2021-01-01 RX ORDER — LORAZEPAM 2 MG/ML
1 INJECTION INTRAMUSCULAR
Status: DISCONTINUED | OUTPATIENT
Start: 2021-01-01 | End: 2021-01-01 | Stop reason: HOSPADM

## 2021-01-01 RX ORDER — MIDODRINE HYDROCHLORIDE 5 MG/1
10 TABLET ORAL ONCE
Status: COMPLETED | OUTPATIENT
Start: 2021-01-01 | End: 2021-01-01

## 2021-01-01 RX ORDER — ONDANSETRON 2 MG/ML
4 INJECTION INTRAMUSCULAR; INTRAVENOUS EVERY 6 HOURS PRN
Status: DISCONTINUED | OUTPATIENT
Start: 2021-01-01 | End: 2021-01-01 | Stop reason: HOSPADM

## 2021-01-01 RX ORDER — ACETAMINOPHEN 325 MG/1
650 TABLET ORAL EVERY 6 HOURS PRN
Status: DISCONTINUED | OUTPATIENT
Start: 2021-01-01 | End: 2021-01-01 | Stop reason: HOSPADM

## 2021-01-01 RX ORDER — LORAZEPAM 2 MG/ML
3 INJECTION INTRAMUSCULAR
Status: DISCONTINUED | OUTPATIENT
Start: 2021-01-01 | End: 2021-01-01 | Stop reason: HOSPADM

## 2021-01-01 RX ORDER — BUMETANIDE 0.25 MG/ML
2 INJECTION, SOLUTION INTRAMUSCULAR; INTRAVENOUS 2 TIMES DAILY
Status: DISCONTINUED | OUTPATIENT
Start: 2021-01-01 | End: 2021-01-01

## 2021-01-01 RX ORDER — BUMETANIDE 0.25 MG/ML
1 INJECTION, SOLUTION INTRAMUSCULAR; INTRAVENOUS 2 TIMES DAILY
Status: DISCONTINUED | OUTPATIENT
Start: 2021-01-01 | End: 2021-01-01

## 2021-01-01 RX ORDER — M-VIT,TX,IRON,MINS/CALC/FOLIC 27MG-0.4MG
1 TABLET ORAL DAILY
Qty: 30 TABLET | Refills: 0 | Status: SHIPPED | OUTPATIENT
Start: 2021-01-01

## 2021-01-01 RX ORDER — FOLIC ACID 1 MG/1
1 TABLET ORAL DAILY
Qty: 30 TABLET | Refills: 0 | Status: SHIPPED | OUTPATIENT
Start: 2021-01-01

## 2021-01-01 RX ORDER — ONDANSETRON 4 MG/1
4 TABLET, ORALLY DISINTEGRATING ORAL EVERY 8 HOURS PRN
Status: DISCONTINUED | OUTPATIENT
Start: 2021-01-01 | End: 2021-01-01 | Stop reason: HOSPADM

## 2021-01-01 RX ORDER — FOLIC ACID 1 MG/1
1 TABLET ORAL DAILY
Status: DISCONTINUED | OUTPATIENT
Start: 2021-01-01 | End: 2021-01-01 | Stop reason: HOSPADM

## 2021-01-01 RX ORDER — NITROGLYCERIN 0.4 MG/1
TABLET SUBLINGUAL
Qty: 25 TABLET | Refills: 0 | Status: SHIPPED | OUTPATIENT
Start: 2021-01-01

## 2021-01-01 RX ORDER — DIGOXIN 125 MCG
125 TABLET ORAL DAILY
Qty: 30 TABLET | Refills: 0 | Status: SHIPPED | OUTPATIENT
Start: 2021-01-01

## 2021-01-01 RX ORDER — ACETAMINOPHEN 650 MG/1
650 SUPPOSITORY RECTAL EVERY 6 HOURS PRN
Status: DISCONTINUED | OUTPATIENT
Start: 2021-01-01 | End: 2021-01-01 | Stop reason: HOSPADM

## 2021-01-01 RX ORDER — POTASSIUM CHLORIDE 20 MEQ/1
40 TABLET, EXTENDED RELEASE ORAL 2 TIMES DAILY
Qty: 60 TABLET | Refills: 0 | Status: SHIPPED | OUTPATIENT
Start: 2021-01-01

## 2021-01-01 RX ORDER — DOXYCYCLINE HYCLATE 100 MG
100 TABLET ORAL EVERY 12 HOURS SCHEDULED
Qty: 10 TABLET | Refills: 0 | Status: SHIPPED | OUTPATIENT
Start: 2021-01-01 | End: 2021-01-01

## 2021-01-01 RX ORDER — LORAZEPAM 1 MG/1
1 TABLET ORAL
Status: DISCONTINUED | OUTPATIENT
Start: 2021-01-01 | End: 2021-01-01 | Stop reason: HOSPADM

## 2021-01-01 RX ORDER — DILTIAZEM HYDROCHLORIDE 5 MG/ML
10 INJECTION INTRAVENOUS ONCE
Status: COMPLETED | OUTPATIENT
Start: 2021-01-01 | End: 2021-01-01

## 2021-01-01 RX ORDER — DILTIAZEM HYDROCHLORIDE 5 MG/ML
10 INJECTION INTRAVENOUS ONCE
Status: DISCONTINUED | OUTPATIENT
Start: 2021-01-01 | End: 2021-01-01

## 2021-01-01 RX ORDER — IPRATROPIUM BROMIDE 21 UG/1
2 SPRAY, METERED NASAL EVERY 12 HOURS
Qty: 1 BOTTLE | Refills: 0 | Status: SHIPPED | OUTPATIENT
Start: 2021-01-01

## 2021-01-01 RX ORDER — LORAZEPAM 1 MG/1
3 TABLET ORAL
Status: DISCONTINUED | OUTPATIENT
Start: 2021-01-01 | End: 2021-01-01 | Stop reason: HOSPADM

## 2021-01-01 RX ORDER — POLYETHYLENE GLYCOL 3350 17 G/17G
17 POWDER, FOR SOLUTION ORAL DAILY PRN
Qty: 527 G | Refills: 1 | Status: SHIPPED | OUTPATIENT
Start: 2021-01-01 | End: 2021-08-06

## 2021-01-01 RX ORDER — SODIUM CHLORIDE 9 MG/ML
25 INJECTION, SOLUTION INTRAVENOUS PRN
Status: DISCONTINUED | OUTPATIENT
Start: 2021-01-01 | End: 2021-01-01 | Stop reason: HOSPADM

## 2021-01-01 RX ORDER — MAGNESIUM SULFATE IN WATER 40 MG/ML
2000 INJECTION, SOLUTION INTRAVENOUS ONCE
Status: COMPLETED | OUTPATIENT
Start: 2021-01-01 | End: 2021-01-01

## 2021-01-01 RX ORDER — MIDODRINE HYDROCHLORIDE 2.5 MG/1
2.5 TABLET ORAL
Status: DISCONTINUED | OUTPATIENT
Start: 2021-01-01 | End: 2021-01-01

## 2021-01-01 RX ORDER — LORAZEPAM 1 MG/1
2 TABLET ORAL
Status: DISCONTINUED | OUTPATIENT
Start: 2021-01-01 | End: 2021-01-01 | Stop reason: HOSPADM

## 2021-01-01 RX ORDER — IPRATROPIUM BROMIDE AND ALBUTEROL SULFATE 2.5; .5 MG/3ML; MG/3ML
3 SOLUTION RESPIRATORY (INHALATION)
Qty: 360 ML | Refills: 0 | Status: SHIPPED | OUTPATIENT
Start: 2021-01-01

## 2021-01-01 RX ORDER — METOLAZONE 2.5 MG/1
5 TABLET ORAL DAILY
Status: DISCONTINUED | OUTPATIENT
Start: 2021-01-01 | End: 2021-01-01

## 2021-01-01 RX ORDER — BUMETANIDE 0.25 MG/ML
1 INJECTION, SOLUTION INTRAMUSCULAR; INTRAVENOUS ONCE
Status: COMPLETED | OUTPATIENT
Start: 2021-01-01 | End: 2021-01-01

## 2021-01-01 RX ORDER — DEXTROSE MONOHYDRATE 25 G/50ML
50 INJECTION, SOLUTION INTRAVENOUS ONCE
Status: DISCONTINUED | OUTPATIENT
Start: 2021-01-01 | End: 2021-01-01

## 2021-01-01 RX ORDER — SODIUM CHLORIDE 0.9 % (FLUSH) 0.9 %
5-40 SYRINGE (ML) INJECTION PRN
Status: DISCONTINUED | OUTPATIENT
Start: 2021-01-01 | End: 2021-01-01 | Stop reason: HOSPADM

## 2021-01-01 RX ORDER — POLYETHYLENE GLYCOL 3350 17 G/17G
17 POWDER, FOR SOLUTION ORAL DAILY PRN
Status: DISCONTINUED | OUTPATIENT
Start: 2021-01-01 | End: 2021-01-01 | Stop reason: HOSPADM

## 2021-01-01 RX ORDER — LORAZEPAM 2 MG/ML
2 INJECTION INTRAMUSCULAR
Status: DISCONTINUED | OUTPATIENT
Start: 2021-01-01 | End: 2021-01-01 | Stop reason: HOSPADM

## 2021-01-01 RX ORDER — 0.9 % SODIUM CHLORIDE 0.9 %
1000 INTRAVENOUS SOLUTION INTRAVENOUS ONCE
Status: COMPLETED | OUTPATIENT
Start: 2021-01-01 | End: 2021-01-01

## 2021-01-01 RX ORDER — GAUZE BANDAGE 2" X 2"
100 BANDAGE TOPICAL DAILY
Status: DISCONTINUED | OUTPATIENT
Start: 2021-01-01 | End: 2021-01-01 | Stop reason: HOSPADM

## 2021-01-01 RX ORDER — DOXYCYCLINE HYCLATE 100 MG
100 TABLET ORAL EVERY 12 HOURS SCHEDULED
Status: DISCONTINUED | OUTPATIENT
Start: 2021-01-01 | End: 2021-01-01 | Stop reason: HOSPADM

## 2021-01-01 RX ADMIN — IPRATROPIUM BROMIDE AND ALBUTEROL SULFATE 1 AMPULE: .5; 3 SOLUTION RESPIRATORY (INHALATION) at 20:12

## 2021-01-01 RX ADMIN — MIDODRINE HYDROCHLORIDE 2.5 MG: 2.5 TABLET ORAL at 08:38

## 2021-01-01 RX ADMIN — BUMETANIDE 2 MG: 0.25 INJECTION, SOLUTION INTRAMUSCULAR; INTRAVENOUS at 08:50

## 2021-01-01 RX ADMIN — MAGNESIUM OXIDE TAB 400 MG (241.3 MG ELEMENTAL MG) 400 MG: 400 (241.3 MG) TAB at 05:55

## 2021-01-01 RX ADMIN — FOLIC ACID 1 MG: 1 TABLET ORAL at 08:38

## 2021-01-01 RX ADMIN — IPRATROPIUM BROMIDE AND ALBUTEROL SULFATE 1 AMPULE: .5; 3 SOLUTION RESPIRATORY (INHALATION) at 15:46

## 2021-01-01 RX ADMIN — IPRATROPIUM BROMIDE AND ALBUTEROL SULFATE 1 AMPULE: .5; 3 SOLUTION RESPIRATORY (INHALATION) at 15:41

## 2021-01-01 RX ADMIN — IPRATROPIUM BROMIDE AND ALBUTEROL SULFATE 1 AMPULE: .5; 3 SOLUTION RESPIRATORY (INHALATION) at 19:13

## 2021-01-01 RX ADMIN — MAGNESIUM OXIDE TAB 400 MG (241.3 MG ELEMENTAL MG) 400 MG: 400 (241.3 MG) TAB at 06:08

## 2021-01-01 RX ADMIN — SODIUM CHLORIDE: 9 INJECTION, SOLUTION INTRAVENOUS at 10:31

## 2021-01-01 RX ADMIN — RIVAROXABAN 15 MG: 15 TABLET, FILM COATED ORAL at 08:38

## 2021-01-01 RX ADMIN — ALLOPURINOL 100 MG: 100 TABLET ORAL at 10:15

## 2021-01-01 RX ADMIN — DOXYCYCLINE HYCLATE 100 MG: 100 TABLET, COATED ORAL at 08:38

## 2021-01-01 RX ADMIN — MAGNESIUM OXIDE TAB 400 MG (241.3 MG ELEMENTAL MG) 400 MG: 400 (241.3 MG) TAB at 05:27

## 2021-01-01 RX ADMIN — MIDODRINE HYDROCHLORIDE 2.5 MG: 2.5 TABLET ORAL at 12:19

## 2021-01-01 RX ADMIN — MIDODRINE HYDROCHLORIDE 2.5 MG: 2.5 TABLET ORAL at 17:33

## 2021-01-01 RX ADMIN — IPRATROPIUM BROMIDE AND ALBUTEROL SULFATE 1 AMPULE: .5; 3 SOLUTION RESPIRATORY (INHALATION) at 11:20

## 2021-01-01 RX ADMIN — MAGNESIUM OXIDE TAB 400 MG (241.3 MG ELEMENTAL MG) 400 MG: 400 (241.3 MG) TAB at 18:01

## 2021-01-01 RX ADMIN — METOLAZONE 5 MG: 2.5 TABLET ORAL at 08:38

## 2021-01-01 RX ADMIN — SPIRONOLACTONE 25 MG: 25 TABLET ORAL at 08:21

## 2021-01-01 RX ADMIN — MIDODRINE HYDROCHLORIDE 2.5 MG: 2.5 TABLET ORAL at 18:02

## 2021-01-01 RX ADMIN — FOLIC ACID 1 MG: 1 TABLET ORAL at 17:58

## 2021-01-01 RX ADMIN — IOPAMIDOL 75 ML: 755 INJECTION, SOLUTION INTRAVENOUS at 21:27

## 2021-01-01 RX ADMIN — SODIUM CHLORIDE, PRESERVATIVE FREE 10 ML: 5 INJECTION INTRAVENOUS at 20:59

## 2021-01-01 RX ADMIN — FOLIC ACID 1 MG: 1 TABLET ORAL at 10:17

## 2021-01-01 RX ADMIN — MIDODRINE HYDROCHLORIDE 5 MG: 5 TABLET ORAL at 11:36

## 2021-01-01 RX ADMIN — BUMETANIDE 1 MG: 0.25 INJECTION, SOLUTION INTRAMUSCULAR; INTRAVENOUS at 18:52

## 2021-01-01 RX ADMIN — DOXYCYCLINE HYCLATE 100 MG: 100 TABLET, COATED ORAL at 20:58

## 2021-01-01 RX ADMIN — IPRATROPIUM BROMIDE AND ALBUTEROL SULFATE 1 AMPULE: .5; 3 SOLUTION RESPIRATORY (INHALATION) at 11:16

## 2021-01-01 RX ADMIN — FOLIC ACID 1 MG: 1 TABLET ORAL at 08:21

## 2021-01-01 RX ADMIN — SPIRONOLACTONE 25 MG: 25 TABLET ORAL at 10:15

## 2021-01-01 RX ADMIN — MAGNESIUM OXIDE TAB 400 MG (241.3 MG ELEMENTAL MG) 400 MG: 400 (241.3 MG) TAB at 17:33

## 2021-01-01 RX ADMIN — MIDODRINE HYDROCHLORIDE 10 MG: 5 TABLET ORAL at 18:05

## 2021-01-01 RX ADMIN — BUMETANIDE 2 MG: 1 TABLET ORAL at 11:36

## 2021-01-01 RX ADMIN — Medication 1 TABLET: at 13:21

## 2021-01-01 RX ADMIN — IOPAMIDOL 75 ML: 755 INJECTION, SOLUTION INTRAVENOUS at 10:19

## 2021-01-01 RX ADMIN — ACETAMINOPHEN 650 MG: 325 TABLET ORAL at 13:21

## 2021-01-01 RX ADMIN — ALLOPURINOL 100 MG: 100 TABLET ORAL at 08:21

## 2021-01-01 RX ADMIN — SODIUM CHLORIDE 250 ML: 9 INJECTION, SOLUTION INTRAVENOUS at 23:24

## 2021-01-01 RX ADMIN — IPRATROPIUM BROMIDE AND ALBUTEROL SULFATE 1 AMPULE: .5; 3 SOLUTION RESPIRATORY (INHALATION) at 11:37

## 2021-01-01 RX ADMIN — DOXYCYCLINE HYCLATE 100 MG: 100 TABLET, COATED ORAL at 20:41

## 2021-01-01 RX ADMIN — FUROSEMIDE 20 MG: 10 INJECTION, SOLUTION INTRAMUSCULAR; INTRAVENOUS at 00:56

## 2021-01-01 RX ADMIN — Medication 1 TABLET: at 10:15

## 2021-01-01 RX ADMIN — SODIUM CHLORIDE, PRESERVATIVE FREE 10 ML: 5 INJECTION INTRAVENOUS at 08:47

## 2021-01-01 RX ADMIN — MAGNESIUM OXIDE TAB 400 MG (241.3 MG ELEMENTAL MG) 400 MG: 400 (241.3 MG) TAB at 17:58

## 2021-01-01 RX ADMIN — IPRATROPIUM BROMIDE AND ALBUTEROL SULFATE 1 AMPULE: .5; 3 SOLUTION RESPIRATORY (INHALATION) at 20:01

## 2021-01-01 RX ADMIN — THIAMINE HCL TAB 100 MG 100 MG: 100 TAB at 08:38

## 2021-01-01 RX ADMIN — MIDODRINE HYDROCHLORIDE 5 MG: 5 TABLET ORAL at 18:01

## 2021-01-01 RX ADMIN — SODIUM CHLORIDE 250 ML: 9 INJECTION, SOLUTION INTRAVENOUS at 20:15

## 2021-01-01 RX ADMIN — DILTIAZEM HYDROCHLORIDE 30 MG: 30 TABLET, FILM COATED ORAL at 21:35

## 2021-01-01 RX ADMIN — RIVAROXABAN 15 MG: 15 TABLET, FILM COATED ORAL at 10:15

## 2021-01-01 RX ADMIN — THIAMINE HCL TAB 100 MG 100 MG: 100 TAB at 08:21

## 2021-01-01 RX ADMIN — IPRATROPIUM BROMIDE AND ALBUTEROL SULFATE 1 AMPULE: .5; 3 SOLUTION RESPIRATORY (INHALATION) at 07:40

## 2021-01-01 RX ADMIN — DILTIAZEM HYDROCHLORIDE 30 MG: 30 TABLET, FILM COATED ORAL at 20:41

## 2021-01-01 RX ADMIN — SPIRONOLACTONE 25 MG: 25 TABLET ORAL at 12:19

## 2021-01-01 RX ADMIN — IPRATROPIUM BROMIDE 2 SPRAY: 21 SPRAY NASAL at 10:26

## 2021-01-01 RX ADMIN — SODIUM CHLORIDE, PRESERVATIVE FREE 10 ML: 5 INJECTION INTRAVENOUS at 12:45

## 2021-01-01 RX ADMIN — DILTIAZEM HYDROCHLORIDE 30 MG: 30 TABLET, FILM COATED ORAL at 05:27

## 2021-01-01 RX ADMIN — MIDODRINE HYDROCHLORIDE 2.5 MG: 2.5 TABLET ORAL at 13:22

## 2021-01-01 RX ADMIN — PIPERACILLIN AND TAZOBACTAM 3375 MG: 3; .375 INJECTION, POWDER, LYOPHILIZED, FOR SOLUTION INTRAVENOUS at 10:36

## 2021-01-01 RX ADMIN — POTASSIUM CHLORIDE 40 MEQ: 20 TABLET, EXTENDED RELEASE ORAL at 10:15

## 2021-01-01 RX ADMIN — DILTIAZEM HYDROCHLORIDE 30 MG: 30 TABLET, FILM COATED ORAL at 06:37

## 2021-01-01 RX ADMIN — SODIUM CHLORIDE, PRESERVATIVE FREE 10 ML: 5 INJECTION INTRAVENOUS at 21:34

## 2021-01-01 RX ADMIN — SODIUM CHLORIDE 1000 ML: 9 INJECTION, SOLUTION INTRAVENOUS at 10:31

## 2021-01-01 RX ADMIN — DEXTROSE MONOHYDRATE 5 MG/HR: 50 INJECTION, SOLUTION INTRAVENOUS at 20:32

## 2021-01-01 RX ADMIN — Medication 1 TABLET: at 08:21

## 2021-01-01 RX ADMIN — THIAMINE HCL TAB 100 MG 100 MG: 100 TAB at 10:18

## 2021-01-01 RX ADMIN — BUMETANIDE 1 MG: 0.25 INJECTION, SOLUTION INTRAMUSCULAR; INTRAVENOUS at 13:25

## 2021-01-01 RX ADMIN — POTASSIUM CHLORIDE 40 MEQ: 20 TABLET, EXTENDED RELEASE ORAL at 13:23

## 2021-01-01 RX ADMIN — SODIUM CHLORIDE, PRESERVATIVE FREE 10 ML: 5 INJECTION INTRAVENOUS at 13:25

## 2021-01-01 RX ADMIN — MAGNESIUM SULFATE HEPTAHYDRATE 2000 MG: 2 INJECTION, SOLUTION INTRAVENOUS at 08:47

## 2021-01-01 RX ADMIN — SODIUM CHLORIDE, PRESERVATIVE FREE 10 ML: 5 INJECTION INTRAVENOUS at 00:07

## 2021-01-01 RX ADMIN — THIAMINE HCL TAB 100 MG 100 MG: 100 TAB at 13:23

## 2021-01-01 RX ADMIN — MIDODRINE HYDROCHLORIDE 5 MG: 5 TABLET ORAL at 10:14

## 2021-01-01 RX ADMIN — BUMETANIDE 2 MG: 1 TABLET ORAL at 10:14

## 2021-01-01 RX ADMIN — IPRATROPIUM BROMIDE AND ALBUTEROL SULFATE 3 ML: .5; 3 SOLUTION RESPIRATORY (INHALATION) at 08:43

## 2021-01-01 RX ADMIN — DOXYCYCLINE HYCLATE 100 MG: 100 TABLET, COATED ORAL at 09:44

## 2021-01-01 RX ADMIN — IPRATROPIUM BROMIDE AND ALBUTEROL SULFATE 1 AMPULE: .5; 3 SOLUTION RESPIRATORY (INHALATION) at 15:45

## 2021-01-01 RX ADMIN — RIVAROXABAN 15 MG: 15 TABLET, FILM COATED ORAL at 08:30

## 2021-01-01 RX ADMIN — DILTIAZEM HYDROCHLORIDE 30 MG: 30 TABLET, FILM COATED ORAL at 20:58

## 2021-01-01 RX ADMIN — NOREPINEPHRINE BITARTRATE 2 MCG/MIN: 1 INJECTION INTRAVENOUS at 10:26

## 2021-01-01 RX ADMIN — MAGNESIUM OXIDE TAB 400 MG (241.3 MG ELEMENTAL MG) 400 MG: 400 (241.3 MG) TAB at 06:37

## 2021-01-01 RX ADMIN — DILTIAZEM HYDROCHLORIDE 10 MG: 5 INJECTION INTRAVENOUS at 20:15

## 2021-01-01 RX ADMIN — IPRATROPIUM BROMIDE AND ALBUTEROL SULFATE 1 AMPULE: .5; 3 SOLUTION RESPIRATORY (INHALATION) at 08:00

## 2021-01-01 RX ADMIN — DOXYCYCLINE HYCLATE 100 MG: 100 TABLET, COATED ORAL at 08:21

## 2021-01-01 RX ADMIN — DILTIAZEM HYDROCHLORIDE 30 MG: 30 TABLET, FILM COATED ORAL at 13:32

## 2021-01-01 RX ADMIN — SODIUM CHLORIDE 250 ML: 9 INJECTION, SOLUTION INTRAVENOUS at 21:35

## 2021-01-01 RX ADMIN — IPRATROPIUM BROMIDE AND ALBUTEROL SULFATE 1 AMPULE: .5; 3 SOLUTION RESPIRATORY (INHALATION) at 11:47

## 2021-01-01 RX ADMIN — ALLOPURINOL 100 MG: 100 TABLET ORAL at 08:38

## 2021-01-01 RX ADMIN — ALLOPURINOL 100 MG: 100 TABLET ORAL at 08:30

## 2021-01-01 RX ADMIN — RIVAROXABAN 15 MG: 15 TABLET, FILM COATED ORAL at 08:21

## 2021-01-01 RX ADMIN — IPRATROPIUM BROMIDE AND ALBUTEROL SULFATE 1 AMPULE: .5; 3 SOLUTION RESPIRATORY (INHALATION) at 07:21

## 2021-01-01 RX ADMIN — IPRATROPIUM BROMIDE 2 SPRAY: 21 SPRAY NASAL at 08:30

## 2021-01-01 RX ADMIN — DOXYCYCLINE HYCLATE 100 MG: 100 TABLET, COATED ORAL at 20:12

## 2021-01-01 RX ADMIN — SODIUM CHLORIDE, PRESERVATIVE FREE 10 ML: 5 INJECTION INTRAVENOUS at 10:17

## 2021-01-01 RX ADMIN — MIDODRINE HYDROCHLORIDE 5 MG: 5 TABLET ORAL at 08:21

## 2021-01-01 RX ADMIN — SODIUM CHLORIDE, PRESERVATIVE FREE 10 ML: 5 INJECTION INTRAVENOUS at 08:31

## 2021-01-01 RX ADMIN — Medication 1 TABLET: at 08:38

## 2021-01-01 RX ADMIN — METOLAZONE 5 MG: 2.5 TABLET ORAL at 13:23

## 2021-01-01 RX ADMIN — DOXYCYCLINE HYCLATE 100 MG: 100 TABLET, COATED ORAL at 10:14

## 2021-01-01 RX ADMIN — MIDODRINE HYDROCHLORIDE 5 MG: 5 TABLET ORAL at 13:32

## 2021-01-01 RX ADMIN — DILTIAZEM HYDROCHLORIDE 30 MG: 30 TABLET, FILM COATED ORAL at 05:55

## 2021-01-01 ASSESSMENT — PAIN DESCRIPTION - LOCATION
LOCATION: BACK
LOCATION: BACK

## 2021-01-01 ASSESSMENT — PAIN DESCRIPTION - ORIENTATION: ORIENTATION: LOWER

## 2021-01-01 ASSESSMENT — PAIN SCALES - GENERAL
PAINLEVEL_OUTOF10: 0
PAINLEVEL_OUTOF10: 3
PAINLEVEL_OUTOF10: 0
PAINLEVEL_OUTOF10: 3
PAINLEVEL_OUTOF10: 0

## 2021-01-01 ASSESSMENT — PAIN DESCRIPTION - FREQUENCY: FREQUENCY: CONTINUOUS

## 2021-01-01 ASSESSMENT — PAIN DESCRIPTION - PROGRESSION
CLINICAL_PROGRESSION: GRADUALLY WORSENING

## 2021-01-01 ASSESSMENT — PAIN DESCRIPTION - ONSET: ONSET: GRADUAL

## 2021-01-01 ASSESSMENT — PAIN DESCRIPTION - PAIN TYPE
TYPE: CHRONIC PAIN
TYPE: CHRONIC PAIN

## 2021-01-01 ASSESSMENT — PAIN - FUNCTIONAL ASSESSMENT: PAIN_FUNCTIONAL_ASSESSMENT: PREVENTS OR INTERFERES SOME ACTIVE ACTIVITIES AND ADLS

## 2021-07-03 PROBLEM — I48.91 ATRIAL FIBRILLATION WITH RVR (HCC): Status: ACTIVE | Noted: 2021-01-01

## 2021-07-03 PROBLEM — R06.02 SHORTNESS OF BREATH: Status: ACTIVE | Noted: 2021-01-01

## 2021-07-03 PROBLEM — I11.0 CONGESTIVE HEART FAILURE DUE TO HIGH BLOOD PRESSURE (HCC): Status: ACTIVE | Noted: 2021-01-01

## 2021-07-03 PROBLEM — M10.9 GOUT: Status: ACTIVE | Noted: 2021-01-01

## 2021-07-03 PROBLEM — M10.9 GOUT OF FOOT: Status: ACTIVE | Noted: 2021-01-01

## 2021-07-03 NOTE — ED PROVIDER NOTES
EMERGENCY DEPARTMENT ENCOUNTER      CHIEF COMPLAINT:   Shortness of breath    CRITICAL CARE NOTE:  There was a high probability of clinically significant life-threatening deterioration of the patient's condition requiring my urgent intervention. Total critical care time is 35 minutes  This includes vital sign monitoring, pulse oximetry monitoring, telemetry monitoring, clinical response to the IV medications, reviewing the nursing notes, consultation time, dictation/documetation time. (This time excludes time spent performing procedures). HPI: Saman Reardon is a 69 y.o. female who presents to the Emergency Department for evaluation of shortness of breath. The patient is here with her family who helps provide the history. Family states that the patient lives alone, but they have cameras on her and they did not see her up in about this morning and so they called but she did not answer. They went to the home and found her on the ground. They think she fell, although the patient does not remember falling. The patient just states that she could not get up off the ground. Family thought she seemed short of breath and brought her to the emergency department. The patient admits to feeling short of breath. It is constant. It is worse with exertion and better with rest.  She also has had several days of watery diarrhea. Her right leg is swollen. There is no known history of DVT or PE. The patient denies leg pain or leg swelling. The patient denies fevers, chills, neck pain, chest pain, hemoptysis, abdominal pain, nausea, vomiting, numbness, tingling, weakness, or any other complaints.     REVIEW OF SYSTEMS:  CONSTITUTIONAL:  Denies fever, chills, weight loss or weakness  EYES:  Denies photophobia or discharge  ENT:  Denies sore throat or ear pain  CARDIOVASCULAR: Denies chest pain or palpitations  RESPIRATORY:  Complains of shortness of breath  GI:  Denies abdominal pain, nausea, vomiting, complains of diarrhea  MUSCULOSKELETAL:  Denies back pain  SKIN: Complains of right leg swelling  NEUROLOGIC:  Denies headache, focal weakness or sensory changes  All systems negative except as marked. \"Remaining review of systems reviewed and negative. I have reviewed the nursing triage documentation and agree unless otherwise noted below. \"      PAST MEDICAL HISTORY:   Past Medical History:   Diagnosis Date    Acute respiratory failure ([facility] 10/20/2015    After cataract surgery, unable to wean her off    Atrial fibrillation ([facility]     Cataract extraction status of right eye 08/18/2015    Chronic atrial fibrillation ([facility] [address] 3/12/2020    New onset atrial fibriallation Metoprolol ER 25 mg daily Refer to cardiologist        Colonoscopy refused     discussed in 1/16    COPD (chronic obstructive pulmonary disease) ([facility] [address] [facility]     seen Dr Leandra Gan Depression 8/25/2015    resolved    Gout     H/O 24 hour EKG monitoring 04/29/2020    Basic rhythm is sinus. One episode of atrial fib RVR Recommend anticoagulation and possible cardioversion.  H/O echocardiogram 10/27/2020    EF 45%. Moderate AR & TR. Mild MR, Moderate Pulmonary HTN.  History of nuclear stress test 10/27/2020    EF 60%. Normal     Hypertension     Mammogram declined     discussed in 1/16    Pneumonia     Tobacco abuse disorder        CURRENT MEDICATIONS:   Home medications reviewed.     SURGICAL HISTORY:   Past Surgical History:   Procedure Laterality Date    CATARACT REMOVAL WITH IMPLANT  10/20/2015    both eyes    DENTAL SURGERY  2016    awaiting full dentures    FRACTURE SURGERY Left ~2009 - 2010    wrist surgery    HYSTERECTOMY  In her late 19's       FAMILY HISTORY:   Family History   Problem Relation Age of Onset    Cancer Mother         Stomach CA    Heart Disease Father     Kidney Disease Father         Was on dialysis    Cancer Brother         pancreas    Cancer Brother         pancreas    Diabetes Sister        SOCIAL HISTORY: Social History     Socioeconomic History    Marital status:      Spouse name: Not on file    Number of children: 3    Years of education: Not on file    Highest education level: Not on file   Occupational History    Not on file   Tobacco Use    Smoking status: Former Smoker     Packs/day: 0.25     Years: 31.00     Pack years: 7.75     Types: Cigarettes     Quit date:      Years since quittin.5    Smokeless tobacco: Never Used    Tobacco comment: 2 cigarettes  a day   Substance and Sexual Activity    Alcohol use: Yes     Alcohol/week: 4.0 - 6.0 standard drinks     Types: 4 - 6 Cans of beer per week    Drug use: No    Sexual activity: Not Currently   Other Topics Concern    Not on file   Social History Narrative    Not on file     Social Determinants of Health     Financial Resource Strain:     Difficulty of Paying Living Expenses:    Food Insecurity:     Worried About Running Out of Food in the Last Year:     [address] in the Last Year:    Transportation Needs:     Lack of Transportation (Medical):  Lack of Transportation (Non-Medical):    Physical Activity:     Days of Exercise per Week:     Minutes of Exercise per Session:    Stress:     Feeling of Stress :    Social Connections:     Frequency of Communication with Friends and Family:     Frequency of Social Gatherings with Friends and Family:     Attends [employer]ism Services:     Active Member of Clubs or Organizations:     Attends Club or Organization Meetings:     Marital Status:    Intimate Partner Violence:     Fear of Current or Ex-Partner:     Emotionally Abused:     Physically Abused:     Sexually Abused:         ALLERGIES: Aspirin    PHYSICAL EXAM:  VITAL SIGNS:   ED Triage Vitals [21 1347]   Enc Vitals Group      /73      Pulse 104      Resp 25      Temp 98 °F (36.7 °C)      Temp Source Oral      SpO2 92 %      Weight 140 lb (63.5 kg)      Height 5' 3\" (1.6 m)      Head Circumference Peak Flow       Pain Score       Pain Loc       Pain Edu? Excl. in [address]? Constitutional:  Non-toxic appearance  HENT: Normocephalic, Atraumatic, Bilateral external ears normal, Oropharynx tacky, No oral exudates, Nose normal.  Eyes:  PERRL, Conjunctiva normal, No discharge. Neck: Normal range of motion, No tenderness, Supple, No stridor, No lymphadenopathy. Cardiovascular:  Tachycardic,  Irregular hythm  Pulmonary/Chest: Mild respiratory distress, dyspneic, gurgling respirations, no wheezing  Abdomen: Bowel sounds normal, Soft, No tenderness, No masses, No pulsatile masses  Back:  No tenderness, No CVA tenderness  Extremities:  Normal range of motion, Intact distal pulses, bilateral lower extremity edema right worse than left, no tenderness  Skin:  Warm, Dry, No erythema, No rash    EKG Interpretation  Interpreted by me  Compared to 3/12/20  Rhythm: atrial fibrillation - rapid  Rate: tachycardia 107  Axis: normal  Ectopy: none  Conduction: irregularly irregular  ST Segments: nonspecific changes  T Waves: no acute change  Clinical Impression: atrial fibrillation with rapid ventricular rate    Cardiac Monitor Strip Interpretation  Interpreted by me  Monitor strip interpreted for greater than 10 seconds  Rhythm: atial fibrillation  Rate: normal 90s  Ectopy: none  ST Segments: normal      Radiology / Procedures:  XR HAND RIGHT (MIN 3 VIEWS) (Final result)  Result time 07/03/21 20:04:10  Final result by Arva Heimlich, DO (07/03/21 20:04:10)                Impression:    No acute fracture or dislocation. WRIST FINDINGS:   Suboptimal visualization of the scaphoid.  No acute fracture given   limitation.  Suboptimal evaluation for dislocation without true lateral view. ORIF of the distal radial metadiaphysis noted.  Widening of the scapholunate. IMPRESSION:   Suboptimal visualization of the scaphoid. No acute fracture given limitation. HAND FINDINGS:   No acute fracture. No dislocation.  Contour abnormality of the distal radial   metaphysis appears chronic on this nondedicated exam.     IMPRESSION:   No acute fracture or dislocation. Narrative:    EXAMINATION:   TWO XRAY VIEWS OF THE RIGHT FOREARM; THREE XRAY VIEWS OF THE RIGHT HAND; 3   XRAY VIEWS OF THE LEFT WRIST     7/3/2021 5:40 pm; 7/3/2021 5:39 pm     COMPARISON:   None. HISTORY:   ORDERING SYSTEM PROVIDED HISTORY: Right arm pain, fall   TECHNOLOGIST PROVIDED HISTORY:   Reason for exam:->Right arm pain, fall   Acuity: Acute   Type of Exam: Initial   Mechanism of Injury: Right arm pain, fall, best images due to pain and   ability to move by pt; ORDERING SYSTEM PROVIDED HISTORY: fall, right hand pain   TECHNOLOGIST PROVIDED HISTORY:   Reason for exam:->fall, right hand pain   Acuity: Acute   Type of Exam: Initial   Mechanism of Injury: fall, rt hand pain; ORDERING SYSTEM PROVIDED HISTORY:   left wrist bruising   TECHNOLOGIST PROVIDED HISTORY:   Reason for exam:->left wrist bruising   Acuity: Acute   Type of Exam: Initial   Additional signs and symptoms: left wrist bruising, prev fx 4 yrs ago with sx     FOREARM FINDINGS:   No acute fracture.  No dislocation.                     XR RADIUS ULNA RIGHT (2 VIEWS) (Final result)  Result time 07/03/21 20:04:10  Final result by Adilia Sanches DO (07/03/21 20:04:10)                Impression:    No acute fracture or dislocation. WRIST FINDINGS:   Suboptimal visualization of the scaphoid.  No acute fracture given   limitation.  Suboptimal evaluation for dislocation without true lateral view. ORIF of the distal radial metadiaphysis noted.  Widening of the scapholunate. IMPRESSION:   Suboptimal visualization of the scaphoid. No acute fracture given limitation. HAND FINDINGS:   No acute fracture. No dislocation. Contour abnormality of the distal radial   metaphysis appears chronic on this nondedicated exam.     IMPRESSION:   No acute fracture or dislocation.              Narrative: EXAMINATION:   TWO XRAY VIEWS OF THE RIGHT FOREARM; THREE XRAY VIEWS OF THE RIGHT HAND; 3   XRAY VIEWS OF THE LEFT WRIST     7/3/2021 5:40 pm; 7/3/2021 5:39 pm     COMPARISON:   None. HISTORY:   ORDERING SYSTEM PROVIDED HISTORY: Right arm pain, fall   TECHNOLOGIST PROVIDED HISTORY:   Reason for exam:->Right arm pain, fall   Acuity: Acute   Type of Exam: Initial   Mechanism of Injury: Right arm pain, fall, best images due to pain and   ability to move by pt; ORDERING SYSTEM PROVIDED HISTORY: fall, right hand pain   TECHNOLOGIST PROVIDED HISTORY:   Reason for exam:->fall, right hand pain   Acuity: Acute   Type of Exam: Initial   Mechanism of Injury: fall, rt hand pain; ORDERING SYSTEM PROVIDED HISTORY:   left wrist bruising   TECHNOLOGIST PROVIDED HISTORY:   Reason for exam:->left wrist bruising   Acuity: Acute   Type of Exam: Initial   Additional signs and symptoms: left wrist bruising, prev fx 4 yrs ago with sx     FOREARM FINDINGS:   No acute fracture.  No dislocation.                     XR WRIST LEFT (MIN 3 VIEWS) (Final result)  Result time 07/03/21 20:04:10  Final result by Erick Acevedo DO (07/03/21 20:04:10)                Impression:    No acute fracture or dislocation. WRIST FINDINGS:   Suboptimal visualization of the scaphoid.  No acute fracture given   limitation.  Suboptimal evaluation for dislocation without true lateral view. ORIF of the distal radial metadiaphysis noted.  Widening of the scapholunate. IMPRESSION:   Suboptimal visualization of the scaphoid. No acute fracture given limitation. HAND FINDINGS:   No acute fracture. No dislocation. Contour abnormality of the distal radial   metaphysis appears chronic on this nondedicated exam.     IMPRESSION:   No acute fracture or dislocation.              Narrative:    EXAMINATION:   TWO XRAY VIEWS OF THE RIGHT FOREARM; THREE XRAY VIEWS OF THE RIGHT HAND; 3   XRAY VIEWS OF THE LEFT WRIST     7/3/2021 5:40 pm; 7/3/2021 5:39 pm COMPARISON:   None. HISTORY:   ORDERING SYSTEM PROVIDED HISTORY: Right arm pain, fall   TECHNOLOGIST PROVIDED HISTORY:   Reason for exam:->Right arm pain, fall   Acuity: Acute   Type of Exam: Initial   Mechanism of Injury: Right arm pain, fall, best images due to pain and   ability to move by pt; ORDERING SYSTEM PROVIDED HISTORY: fall, right hand pain   TECHNOLOGIST PROVIDED HISTORY:   Reason for exam:->fall, right hand pain   Acuity: Acute   Type of Exam: Initial   Mechanism of Injury: fall, rt hand pain; ORDERING SYSTEM PROVIDED HISTORY:   left wrist bruising   TECHNOLOGIST PROVIDED HISTORY:   Reason for exam:->left wrist bruising   Acuity: Acute   Type of Exam: Initial   Additional signs and symptoms: left wrist bruising, prev fx 4 yrs ago with sx     FOREARM FINDINGS:   No acute fracture.  No dislocation.                     CT CERVICAL SPINE WO CONTRAST (Final result)  Result time 07/03/21 20:07:45  Final result by Ragini Javier MD (07/03/21 20:07:45)                Impression:    Head CT: No acute intracranial abnormality detected. Cervical spine CT: No acute fracture or traumatic malalignment. Narrative:    EXAMINATION:   CT OF THE HEAD WITHOUT CONTRAST; CT OF THE CERVICAL SPINE WITHOUT CONTRAST   7/3/2021 2:39 pm     TECHNIQUE:   CT of the head was performed without the administration of intravenous   contrast. Dose modulation, iterative reconstruction, and/or weight based   adjustment of the mA/kV was utilized to reduce the radiation dose to as low   as reasonably achievable.; CT of the cervical spine was performed without the   administration of intravenous contrast. Multiplanar reformatted images are   provided for review. Dose modulation, iterative reconstruction, and/or weight   based adjustment of the mA/kV was utilized to reduce the radiation dose to as   low as reasonably achievable.      COMPARISON:   Head CT, 01/09/2016     Cervical spine CT, 07/03/2010     HISTORY: ORDERING SYSTEM PROVIDED HISTORY: Fall, hir head   TECHNOLOGIST PROVIDED HISTORY:   Has a \"code stroke\" or \"stroke alert\" been called? ->No   Reason for exam:->Fall, hir head   Decision Support Exception - unselect if not a suspected or confirmed   emergency medical condition->Emergency Medical Condition ([place])   Acuity: Acute   Type of Exam: Initial   Mechanism of Injury: Fall, hit head; ORDERING SYSTEM PROVIDED HISTORY: Fall,   Neck pain   TECHNOLOGIST PROVIDED HISTORY:   Reason for exam:->Fall, Neck pain   Decision Support Exception - unselect if not a suspected or confirmed   emergency medical condition->Emergency Medical Condition (MA)   Acuity: Acute   Type of Exam: Initial   Mechanism of Injury: Fall, Neck pain     FINDINGS:   HEAD CT:     BRAIN/VENTRICLES:  No acute loss of the gray-white matter differentiation is   identified to suggest acute or subacute infarct.  No masses or hemorrhages   within the brain parenchyma are found. No evidence of midline shift. There is moderate periventricular   low-attenuation, compatible with chronic small vessel ischemic disease. The intracranial vasculature, including the dural venous sinuses, is within   normal limits. ORBITS: No acute orbital abnormalities are identified. SINUSES: The visualized paranasal sinuses and mastoid air cells are clear. SOFT TISSUES/SKULL: The calvarium is intact.  Extracranial soft tissues are   unremarkable. CERVICAL SPINE CT:     BONES/ALIGNMENT: No acute fracture or traumatic malalignment. DEGENERATIVE CHANGES: Multilevel degenerative disc and facet disease noted. No high-grade central canal stenosis is found.      SOFT TISSUES: Prevertebral soft tissues are unremarkable.  Extensive scarring   and fibrosis noted within the upper lungs, not well evaluated.                     CT HEAD WO CONTRAST (Final result)  Result time 07/03/21 20:07:45  Final result by Sukhjinder Jaffe MD (07/03/21 20:07:45) Impression:    Head CT: No acute intracranial abnormality detected. Cervical spine CT: No acute fracture or traumatic malalignment. Narrative:    EXAMINATION:   CT OF THE HEAD WITHOUT CONTRAST; CT OF THE CERVICAL SPINE WITHOUT CONTRAST   7/3/2021 2:39 pm     TECHNIQUE:   CT of the head was performed without the administration of intravenous   contrast. Dose modulation, iterative reconstruction, and/or weight based   adjustment of the mA/kV was utilized to reduce the radiation dose to as low   as reasonably achievable.; CT of the cervical spine was performed without the   administration of intravenous contrast. Multiplanar reformatted images are   provided for review. Dose modulation, iterative reconstruction, and/or weight   based adjustment of the mA/kV was utilized to reduce the radiation dose to as   low as reasonably achievable. COMPARISON:   Head CT, 01/09/2016     Cervical spine CT, 07/03/2010     HISTORY:   ORDERING SYSTEM PROVIDED HISTORY: Fall, hir head   TECHNOLOGIST PROVIDED HISTORY:   Has a \"code stroke\" or \"stroke alert\" been called? ->No   Reason for exam:->Fall, hir head   Decision Support Exception - unselect if not a suspected or confirmed   emergency medical condition->Emergency Medical Condition (MA)   Acuity: Acute   Type of Exam: Initial   Mechanism of Injury: Fall, hit head; ORDERING SYSTEM PROVIDED HISTORY: Fall,   Neck pain   TECHNOLOGIST PROVIDED HISTORY:   Reason for exam:->Fall, Neck pain   Decision Support Exception - unselect if not a suspected or confirmed   emergency medical condition->Emergency Medical Condition (MA)   Acuity: Acute   Type of Exam: Initial   Mechanism of Injury: Fall, Neck pain     FINDINGS:   HEAD CT:     BRAIN/VENTRICLES:  No acute loss of the gray-white matter differentiation is   identified to suggest acute or subacute infarct.  No masses or hemorrhages   within the brain parenchyma are found. No evidence of midline shift.  There is moderate periventricular   low-attenuation, compatible with chronic small vessel ischemic disease. The intracranial vasculature, including the dural venous sinuses, is within   normal limits. ORBITS: No acute orbital abnormalities are identified. SINUSES: The visualized paranasal sinuses and mastoid air cells are clear. SOFT TISSUES/SKULL: The calvarium is intact.  Extracranial soft tissues are   unremarkable. CERVICAL SPINE CT:     BONES/ALIGNMENT: No acute fracture or traumatic malalignment. DEGENERATIVE CHANGES: Multilevel degenerative disc and facet disease noted. No high-grade central canal stenosis is found. SOFT TISSUES: Prevertebral soft tissues are unremarkable.  Extensive scarring   and fibrosis noted within the upper lungs, not well evaluated.                     VL DUP LOWER EXTREMITY VENOUS RIGHT (Final result)  Result time 07/03/21 18:30:17  Final result by Bright Koch MD (07/03/21 18:30:17)                Impression:    No evidence of DVT in the right lower extremity. Narrative:    EXAMINATION:   DUPLEX VENOUS ULTRASOUND OF THE RIGHT LOWER EXTREMITY, 7/3/2021 4:48 pm     TECHNIQUE:   Duplex ultrasound using B-mode/gray scaled imaging and Doppler spectral   analysis and color flow was obtained of the right lower extremity. COMPARISON:   None. HISTORY:   ORDERING SYSTEM PROVIDED HISTORY: right leg swelling   TECHNOLOGIST PROVIDED HISTORY:   Reason for exam:->right leg swelling   Reason for Exam: RT leg swelling x2 days   Acuity: Acute   Type of Exam: Initial     FINDINGS:   The visualized veins of the right lower extremity are patent and free of   echogenic thrombus. The veins demonstrate good compressibility with normal   color flow study and spectral analysis.         XR CHEST PORTABLE (Final result)  Result time 07/03/21 15:01:55  Final result by Curtis Garcia MD (07/03/21 15:01:55)                Impression:    No acute abnormality.  COPD.     Chronic pleural thickening at the right lung apex.  Nonemergent CT follow-up   could be considered. Narrative:    EXAMINATION:   ONE XRAY VIEW OF THE CHEST     7/3/2021 2:24 pm     COMPARISON:   10/21/2015, 05/31/2013     HISTORY:   ORDERING SYSTEM PROVIDED HISTORY: shortness of breath   TECHNOLOGIST PROVIDED HISTORY:   Reason for exam:->shortness of breath   Acuity: Acute   Type of Exam: Initial   Additional signs and symptoms: sob, hx COPD     FINDINGS:   Stable pleural thickening at the right lung apex since 2015 but new compared   to 2013.  Stable mild cardiomegaly.  Chronic reticular opacities.  No focal   lung consolidation.  No pneumothorax or pleural effusion.  COPD. Labs Reviewed   CBC WITH AUTO DIFFERENTIAL - Abnormal; Notable for the following components:       Result Value    RBC 4.01 (*)     MCH 32.9 (*)     Platelets 99 (*)     Segs Relative 80.6 (*)     Lymphocytes % 8.1 (*)     Monocytes % 10.0 (*)     Immature Neutrophil % 0.6 (*)     All other components within normal limits   COMPREHENSIVE METABOLIC PANEL - Abnormal; Notable for the following components:    Sodium 124 (*)     Chloride 88 (*)     Total Bilirubin 3.3 (*)     AST 80 (*)     Alkaline Phosphatase 170 (*)     All other components within normal limits   BRAIN NATRIURETIC PEPTIDE - Abnormal; Notable for the following components:    Pro-BNP 1,827 (*)     All other components within normal limits   PROTIME/INR & PTT - Abnormal; Notable for the following components:    Protime 20.6 (*)     All other components within normal limits   CK - Abnormal; Notable for the following components:     Total  (*)     All other components within normal limits   POCT VENOUS - Abnormal; Notable for the following components:    pH, Alejandro 7.31 (*)     HCO3, Venous 25.1 (*)     CO2 Content 26.7 (*)     All other components within normal limits   TROPONIN   LACTIC ACID, PLASMA   DIGOXIN LEVEL       ED COURSE & MEDICAL DECISION MAKING:  Pertinent Labs & Imaging studies reviewed. (See chart for details)  On exam, the patient is afebrile and nontoxic appearing. She seems generally weak. She is mildly tachycardic in the low 100s. She is hypoxic at 87 to 88% on room air and is placed on 2 L by nasal cannula with improvement. She s otherwise hemodynamically stable and neurologically intact. EKG shows atrial fibrillation with a rate of 107. There is no ST elevation or depression. Labs are obtained and are significant for mildly elevated CK, venous pH of 7.31 with a normal PCO2, hyponatremia, a BNP of 1827 and an INR of 1.64. Chest x-ray is significant for COPD with no acute abnormality. CT head and cervical spine are negative. X-rays of the left wrist, right forearm and right hand are negative for acute bony abnormality. Venous duplex of the right lower extremity is negative. CTA of the chest was ordered but the patient's IV infiltrated with contrast and so it was discontinued. The patient became more tachycardic while in the emergency department with heart rates up to the 140s. She was given a Cardizem bolus and drip. She was given gentle IV fluids (250 mL bolus). I spoke with the hospitalist regarding admission and he wanted to get a CTA of the chest prior to admitting her here. This study is pending. 20:10  I have signed out Charles Snider's  Emergency Department care to Dr. Lucy Lozada. We discussed the history, physical exam, completed/pending test results (if obtained) and current treatment plan. Please refer to his/her chart for further details, remaining Emergency Department course, final disposition and diagnosis. Clinical Impression:  1. Fall, initial encounter    2. Diarrhea, unspecified type    3. Shortness of breath    4. Hypoxia    5. Atrial fibrillation with RVR ([address]    6. Hyponatremia    7.  Peripheral edema        Comment: Please note this report has been produced using speech recognition software and may contain errors related to that system including errors in grammar, punctuation, and spelling, as well as words and phrases that may be inappropriate. If there are any questions or concerns please feel free to contact the dictating provider for clarification.         Willie Goltz, MD  07/03/21 2023

## 2021-07-03 NOTE — DISCHARGE INSTR - COC
YWTZQX:170103820}    Nursing Mobility/ADLs:  Walking   {CHP DME VINAYAK:089175960}  Transfer  {CHP DME IDOB:905645183}  Bathing  {CHP DME AEL}  Dressing  {CHP DME SERJIO:239043336}  Toileting  {CHP DME OSWL:074449080}  Feeding  {[facility] DME OXXI:058075182}  Med Admin  {P DME WUFO:551002840}  Med Delivery   {[facility] [place] [facility] MED Delivery:051667233}    Wound Care Documentation and Therapy:  Incision 08/18/15 Eye (Active)   Number of days: 2145        Elimination:  Continence:   · Bowel: {YES / FO:14410}  · Bladder: {YES / BD:42755}  Urinary Catheter: {Urinary Catheter:867492182}   Colostomy/Ileostomy/Ileal Conduit: {YES / IO:32622}       Date of Last BM: ***  No intake or output data in the 24 hours ending 21 1418  No intake/output data recorded.     Safety Concerns:     [employer] Safety Concerns:813032864}    Impairments/Disabilities:      [employer] Impairments/Disabilities:689476468}    Nutrition Therapy:  Current Nutrition Therapy:   [employer] Diet List:595848618}    Routes of Feeding: {[facility] DME Other Feedings:576488502}  Liquids: {Slp liquid thickness:91575}  Daily Fluid Restriction: {P DME Yes amt example:551909335}  Last Modified Barium Swallow with Video (Video Swallowing Test): {Done Not Done QLDS:378696208}    Treatments at the Time of Hospital Discharge:   Respiratory Treatments: ***  Oxygen Therapy:  {Therapy; copd oxygen:37627}  Ventilator:    { CC Vent DQNE:331914917}    Rehab Therapies: {THERAPEUTIC INTERVENTION:6689248896}  Weight Bearing Status/Restrictions: [employer] Weight Bearin}  Other Medical Equipment (for information only, NOT a DME order):  {EQUIPMENT:364505225}  Other Treatments: ***    Patient's personal belongings (please select all that are sent with patient):  {[facility] DME Belongings:174964900}    RN SIGNATURE:  {Esignature:926306781}    CASE MANAGEMENT/SOCIAL WORK SECTION    Inpatient Status Date: ***    Readmission Risk Assessment Score:  Readmission Risk              Risk of Unplanned Readmission:  0           Discharging to Facility/ Agency   · Name:   · Address:  · Phone:  · Fax:    Dialysis Facility (if applicable)   · Name:  · Address:  · Dialysis Schedule:  · Phone:  · Fax:    / signature: {Esignature:552052717}    PHYSICIAN SECTION    Prognosis: {Prognosis:5169737668}    Condition at Discharge: Rupert Singh Patient Condition:478713681}    Rehab Potential (if transferring to Rehab): {Prognosis:2081658371}    Recommended Labs or Other Treatments After Discharge: ***    Physician Certification: I certify the above information and transfer of Samara France  is necessary for the continuing treatment of the diagnosis listed and that she requires {Admit to Appropriate Level of Care:28617} for {GREATER/LESS:322085095} 30 days.      Update Admission H&P: {CHP DME Changes in XZFKM:903673152}    PHYSICIAN SIGNATURE:  {Esignature:172361649}

## 2021-07-04 PROBLEM — E87.1 ACUTE HYPONATREMIA: Status: ACTIVE | Noted: 2021-01-01

## 2021-07-04 PROBLEM — I50.33 ACUTE ON CHRONIC HEART FAILURE WITH PRESERVED EJECTION FRACTION (HFPEF) (HCC): Status: ACTIVE | Noted: 2021-01-01

## 2021-07-04 NOTE — PLAN OF CARE
Patient kept on 2 Liters of oxygen during this shift. Patient continues to complain of shortness of breath during ambulation.   Problem: Breathing Pattern - Ineffective:  Goal: Ability to achieve and maintain a regular respiratory rate will improve  Description: Ability to achieve and maintain a regular respiratory rate will improve  Outcome: Ongoing

## 2021-07-04 NOTE — ED PROVIDER NOTES
Please refer to the documentation completed by Dr. Leydi Valera for full details of the encounter.     Results:  Results for orders placed or performed during the hospital encounter of 07/03/21   COVID-19, Rapid    Specimen: Nasopharyngeal   Result Value Ref Range    Source THROAT     SARS-CoV-2, NAAT NOT DETECTED NOT DETECTED   CBC Auto Differential   Result Value Ref Range    WBC 8.4 4.0 - 10.5 K/CU MM    RBC 4.01 (L) 4.2 - 5.4 M/CU MM    Hemoglobin 13.2 12.5 - 16.0 GM/DL    Hematocrit 39.1 37 - 47 %    MCV 97.5 78 - 100 FL    MCH 32.9 (H) 27 - 31 PG    MCHC 33.8 32.0 - 36.0 %    RDW 13.6 11.7 - 14.9 %    Platelets 99 (L) 879 - 440 K/CU MM    MPV 10.1 7.5 - 11.1 FL    Differential Type AUTOMATED DIFFERENTIAL     Segs Relative 80.6 (H) 36 - 66 %    Lymphocytes % 8.1 (L) 24 - 44 %    Monocytes % 10.0 (H) 0 - 4 %    Eosinophils % 0.1 0 - 3 %    Basophils % 0.6 0 - 1 %    Segs Absolute 6.8 K/CU MM    Lymphocytes Absolute 0.7 K/CU MM    Monocytes Absolute 0.8 K/CU MM    Eosinophils Absolute 0.0 K/CU MM    Basophils Absolute 0.1 K/CU MM    Immature Neutrophil % 0.6 (H) 0 - 0.43 %    Total Immature Neutrophil 0.05 K/CU MM   CMP   Result Value Ref Range    Sodium 124 (L) 135 - 145 MMOL/L    Potassium 4.7 3.5 - 5.1 MMOL/L    Chloride 88 (L) 99 - 110 mMol/L    CO2 25 21 - 32 MMOL/L    BUN 13 6 - 23 MG/DL    CREATININE 0.6 0.6 - 1.1 MG/DL    Glucose 84 70 - 99 MG/DL    Calcium 8.9 8.3 - 10.6 MG/DL    Albumin 3.6 3.4 - 5.0 GM/DL    Total Protein 7.8 6.4 - 8.2 GM/DL    Total Bilirubin 3.3 (H) 0.0 - 1.0 MG/DL    ALT 15 10 - 40 U/L    AST 80 (H) 15 - 37 IU/L    Alkaline Phosphatase 170 (H) 40 - 129 IU/L    GFR Non-African American >60 >60 mL/min/1.73m2    GFR African American >60 >60 mL/min/1.73m2    Anion Gap 11 4 - 16   Troponin   Result Value Ref Range    Troponin T <0.010 <0.01 NG/ML   Brain Natriuretic Peptide   Result Value Ref Range    Pro-BNP 1,827 (H) <300 PG/ML   Protime/INR & PTT   Result Value Ref Range    Protime 20.6 (H) 11.7 - 14.5 SECONDS    INR 1.64 INDEX    aPTT 31.7 25.1 - 37.1 SECONDS   Lactate, Plasma   Result Value Ref Range    Lactate  0.4 - 2.0 mMOL/L     LACTIC 2.8 CALLED TO TABBY RN ED [id number] [address] KRIS   CK   Result Value Ref Range    Total  (H) 26 - 140 IU/L   POCT Venous   Result Value Ref Range    pH, Alejandro 7.31 (L) 7.32 - 7.42    pCO2, Alejandro 50.4 38 - 52 mmHG    pO2, Alejandro 40.6 28 - 48 mmHG    Base Exc, Mixed 1.9 0 - 2.3    Base Excess MINUS 0 - 2.4    HCO3, Venous 25.1 (H) 19 - 25 MMOL/L    O2 Sat, Alejandro 70.0 50 - 70 %    CO2 Content 26.7 (H) 19 - 24 MMOL/L    Source: Venous    EKG 12 Lead   Result Value Ref Range    Ventricular Rate 107 BPM    Atrial Rate 133 BPM    QRS Duration 74 ms    Q-T Interval 336 ms    QTc Calculation (Bazett) 448 ms    R Axis 22 degrees    T Axis 255 degrees    Diagnosis       Atrial fibrillation with rapid ventricular response  ST & T wave abnormality, consider inferior ischemia  Abnormal ECG  No previous ECGs available  Confirmed by PENELOPE Sky ([id number]) on 7/3/2021 6:00:17 PM       Radiology:  XR RADIUS ULNA RIGHT (2 VIEWS)    Result Date: 7/3/2021  EXAMINATION: TWO XRAY VIEWS OF THE RIGHT FOREARM; THREE XRAY VIEWS OF THE RIGHT HAND; 3 XRAY VIEWS OF THE LEFT WRIST 7/3/2021 5:40 pm; 7/3/2021 5:39 pm COMPARISON: None.  HISTORY: ORDERING SYSTEM PROVIDED HISTORY: Right arm pain, fall TECHNOLOGIST PROVIDED HISTORY: Reason for exam:->Right arm pain, fall Acuity: Acute Type of Exam: Initial Mechanism of Injury: Right arm pain, fall, best images due to pain and ability to move by pt; ORDERING SYSTEM PROVIDED HISTORY: fall, right hand pain TECHNOLOGIST PROVIDED HISTORY: Reason for exam:->fall, right hand pain Acuity: Acute Type of Exam: Initial Mechanism of Injury: fall, rt hand pain; ORDERING SYSTEM PROVIDED HISTORY: left wrist bruising TECHNOLOGIST PROVIDED HISTORY: Reason for exam:->left wrist bruising Acuity: Acute Type of Exam: Initial Additional signs and symptoms: left wrist bruising, prev fx 4 yrs ago with sx FOREARM FINDINGS: No acute fracture. No dislocation. No acute fracture or dislocation. WRIST FINDINGS: Suboptimal visualization of the scaphoid. No acute fracture given limitation. Suboptimal evaluation for dislocation without true lateral view. ORIF of the distal radial metadiaphysis noted. Widening of the scapholunate. IMPRESSION: Suboptimal visualization of the scaphoid. No acute fracture given limitation. HAND FINDINGS: No acute fracture. No dislocation. Contour abnormality of the distal radial metaphysis appears chronic on this nondedicated exam. IMPRESSION: No acute fracture or dislocation. XR WRIST LEFT (MIN 3 VIEWS)    Result Date: 7/3/2021  EXAMINATION: TWO XRAY VIEWS OF THE RIGHT FOREARM; THREE XRAY VIEWS OF THE RIGHT HAND; 3 XRAY VIEWS OF THE LEFT WRIST 7/3/2021 5:40 pm; 7/3/2021 5:39 pm COMPARISON: None. HISTORY: ORDERING SYSTEM PROVIDED HISTORY: Right arm pain, fall TECHNOLOGIST PROVIDED HISTORY: Reason for exam:->Right arm pain, fall Acuity: Acute Type of Exam: Initial Mechanism of Injury: Right arm pain, fall, best images due to pain and ability to move by pt; ORDERING SYSTEM PROVIDED HISTORY: fall, right hand pain TECHNOLOGIST PROVIDED HISTORY: Reason for exam:->fall, right hand pain Acuity: Acute Type of Exam: Initial Mechanism of Injury: fall, rt hand pain; ORDERING SYSTEM PROVIDED HISTORY: left wrist bruising TECHNOLOGIST PROVIDED HISTORY: Reason for exam:->left wrist bruising Acuity: Acute Type of Exam: Initial Additional signs and symptoms: left wrist bruising, prev fx 4 yrs ago with sx FOREARM FINDINGS: No acute fracture. No dislocation. No acute fracture or dislocation. WRIST FINDINGS: Suboptimal visualization of the scaphoid. No acute fracture given limitation. Suboptimal evaluation for dislocation without true lateral view. ORIF of the distal radial metadiaphysis noted. Widening of the scapholunate.  IMPRESSION: Suboptimal visualization of the scaphoid. No acute fracture given limitation. HAND FINDINGS: No acute fracture. No dislocation. Contour abnormality of the distal radial metaphysis appears chronic on this nondedicated exam. IMPRESSION: No acute fracture or dislocation. XR HAND RIGHT (MIN 3 VIEWS)    Result Date: 7/3/2021  EXAMINATION: TWO XRAY VIEWS OF THE RIGHT FOREARM; THREE XRAY VIEWS OF THE RIGHT HAND; 3 XRAY VIEWS OF THE LEFT WRIST 7/3/2021 5:40 pm; 7/3/2021 5:39 pm COMPARISON: None. HISTORY: ORDERING SYSTEM PROVIDED HISTORY: Right arm pain, fall TECHNOLOGIST PROVIDED HISTORY: Reason for exam:->Right arm pain, fall Acuity: Acute Type of Exam: Initial Mechanism of Injury: Right arm pain, fall, best images due to pain and ability to move by pt; ORDERING SYSTEM PROVIDED HISTORY: fall, right hand pain TECHNOLOGIST PROVIDED HISTORY: Reason for exam:->fall, right hand pain Acuity: Acute Type of Exam: Initial Mechanism of Injury: fall, rt hand pain; ORDERING SYSTEM PROVIDED HISTORY: left wrist bruising TECHNOLOGIST PROVIDED HISTORY: Reason for exam:->left wrist bruising Acuity: Acute Type of Exam: Initial Additional signs and symptoms: left wrist bruising, prev fx 4 yrs ago with sx FOREARM FINDINGS: No acute fracture. No dislocation. No acute fracture or dislocation. WRIST FINDINGS: Suboptimal visualization of the scaphoid. No acute fracture given limitation. Suboptimal evaluation for dislocation without true lateral view. ORIF of the distal radial metadiaphysis noted. Widening of the scapholunate. IMPRESSION: Suboptimal visualization of the scaphoid. No acute fracture given limitation. HAND FINDINGS: No acute fracture. No dislocation. Contour abnormality of the distal radial metaphysis appears chronic on this nondedicated exam. IMPRESSION: No acute fracture or dislocation.      CT HEAD WO CONTRAST    Result Date: 7/3/2021  EXAMINATION: CT OF THE HEAD WITHOUT CONTRAST; CT OF THE CERVICAL SPINE WITHOUT CONTRAST 7/3/2021 2:39 pm TECHNIQUE: CT of the head was performed without the administration of intravenous contrast. Dose modulation, iterative reconstruction, and/or weight based adjustment of the mA/kV was utilized to reduce the radiation dose to as low as reasonably achievable.; CT of the cervical spine was performed without the administration of intravenous contrast. Multiplanar reformatted images are provided for review. Dose modulation, iterative reconstruction, and/or weight based adjustment of the mA/kV was utilized to reduce the radiation dose to as low as reasonably achievable. COMPARISON: Head CT, 01/09/2016 Cervical spine CT, 07/03/2010 HISTORY: ORDERING SYSTEM PROVIDED HISTORY: Fall, hir head TECHNOLOGIST PROVIDED HISTORY: Has a \"code stroke\" or \"stroke alert\" been called? ->No Reason for exam:->Fall, hir head Decision Support Exception - unselect if not a suspected or confirmed emergency medical condition->Emergency Medical Condition (MA) Acuity: Acute Type of Exam: Initial Mechanism of Injury: Fall, hit head; ORDERING SYSTEM PROVIDED HISTORY: Fall, Neck pain TECHNOLOGIST PROVIDED HISTORY: Reason for exam:->Fall, Neck pain Decision Support Exception - unselect if not a suspected or confirmed emergency medical condition->Emergency Medical Condition (MA) Acuity: Acute Type of Exam: Initial Mechanism of Injury: Fall, Neck pain FINDINGS: HEAD CT: BRAIN/VENTRICLES:  No acute loss of the gray-white matter differentiation is identified to suggest acute or subacute infarct. No masses or hemorrhages within the brain parenchyma are found. No evidence of midline shift. There is moderate periventricular low-attenuation, compatible with chronic small vessel ischemic disease. The intracranial vasculature, including the dural venous sinuses, is within normal limits. ORBITS: No acute orbital abnormalities are identified. SINUSES: The visualized paranasal sinuses and mastoid air cells are clear. SOFT TISSUES/SKULL: The calvarium is intact. Type of Exam: Initial Mechanism of Injury: Fall, Neck pain FINDINGS: HEAD CT: BRAIN/VENTRICLES:  No acute loss of the gray-white matter differentiation is identified to suggest acute or subacute infarct. No masses or hemorrhages within the brain parenchyma are found. No evidence of midline shift. There is moderate periventricular low-attenuation, compatible with chronic small vessel ischemic disease. The intracranial vasculature, including the dural venous sinuses, is within normal limits. ORBITS: No acute orbital abnormalities are identified. SINUSES: The visualized paranasal sinuses and mastoid air cells are clear. SOFT TISSUES/SKULL: The calvarium is intact. Extracranial soft tissues are unremarkable. CERVICAL SPINE CT: BONES/ALIGNMENT: No acute fracture or traumatic malalignment. DEGENERATIVE CHANGES: Multilevel degenerative disc and facet disease noted. No high-grade central canal stenosis is found. SOFT TISSUES: Prevertebral soft tissues are unremarkable. Extensive scarring and fibrosis noted within the upper lungs, not well evaluated. Head CT: No acute intracranial abnormality detected. Cervical spine CT: No acute fracture or traumatic malalignment. XR CHEST PORTABLE    Result Date: 7/3/2021  EXAMINATION: ONE XRAY VIEW OF THE CHEST 7/3/2021 2:24 pm COMPARISON: 10/21/2015, 05/31/2013 HISTORY: ORDERING SYSTEM PROVIDED HISTORY: shortness of breath TECHNOLOGIST PROVIDED HISTORY: Reason for exam:->shortness of breath Acuity: Acute Type of Exam: Initial Additional signs and symptoms: sob, hx COPD FINDINGS: Stable pleural thickening at the right lung apex since 2015 but new compared to 2013. Stable mild cardiomegaly. Chronic reticular opacities. No focal lung consolidation. No pneumothorax or pleural effusion. COPD. No acute abnormality. COPD. Chronic pleural thickening at the right lung apex. Nonemergent CT follow-up could be considered.      CTA PULMONARY W CONTRAST    Result Date: 7/3/2021  EXAMINATION: CTA OF THE CHEST 7/3/2021 6:08 pm TECHNIQUE: CTA of the chest was performed after the administration of intravenous contrast.  Multiplanar reformatted images are provided for review. MIP images are provided for review. Dose modulation, iterative reconstruction, and/or weight based adjustment of the mA/kV was utilized to reduce the radiation dose to as low as reasonably achievable. COMPARISON: Chest CT, 09/24/2009 HISTORY: ORDERING SYSTEM PROVIDED HISTORY: Shortness of breath TECHNOLOGIST PROVIDED HISTORY: Reason for exam:->Shortness of breath Decision Support Exception - unselect if not a suspected or confirmed emergency medical condition->Emergency Medical Condition (MA) Reason for Exam: Shortness of breath Acuity: Acute Type of Exam: Initial Mechanism of Injury: fall FINDINGS: Pulmonary Arteries: Pulmonary arteries are adequately opacified. No filling defects are identified within the pulmonary arteries to suggest pulmonary embolism. The main pulmonary artery is dilated, raising the possibility of pulmonary hypertension. Mediastinum: Visualized thyroid is unremarkable. There is increased lymph tissue identified within the tiffanie bilaterally, but without a well-defined lymph node. Amount of soft tissue is similar to mildly increased when compared to the previous exam from 2009. Esophagus is unremarkable. Cardiac chambers are enlarged, especially on the right. No pericardial effusion is seen. Thoracic aorta is normal in caliber without evidence of dissection. Lungs/pleura: Scarring is noted within the lungs bilaterally, especially in the mid upper lungs, progressed when compared to the previous exam.  There is asymmetric volume loss seen within the right lung. Within the medial aspect of the right lower lobe, there is a new pleural based lentiform masslike opacity measuring maximally 2.8 x 1.4 cm. There are patchy areas of ground-glass opacity found within the lungs bilaterally.   There is diffuse bronchial wall thickening. Filling defects are suggested within the lower lung airways, especially on the right. Airway narrowing is seen bilaterally, presumably secondary to the central fibrosis. No pneumothorax is identified. No pleural effusions are seen. Upper Abdomen: Limited imaging of the upper abdomen is unremarkable. Soft Tissues/Bones: No acute or suspicious bony abnormality. Chronic appearing compression deformities within the midthoracic spine seen. No evidence of pulmonary embolism or aortic dissection. Patchy ground-glass infiltrates seen within both lungs. These are very nonspecific and may be related to the fibrosis. Inflammatory/infectious pneumonitis should be considered as well, including both typical and atypical etiologies. Suspicious appearing lentiform masslike opacity within the medial aspect of the right lower lung, not visualized previously. After resolution of acute symptoms, further evaluation with PET-CT and/or with histopathologic correlation is recommended. Redemonstration of fibrosis within both lungs as well as within the mediastinum, leading to volume loss of the lungs, as well as bilateral airway narrowing. Overall, that appears progressed when compared to the previous exam from 2009. VL DUP LOWER EXTREMITY VENOUS RIGHT    Result Date: 7/3/2021  EXAMINATION: DUPLEX VENOUS ULTRASOUND OF THE RIGHT LOWER EXTREMITY, 7/3/2021 4:48 pm TECHNIQUE: Duplex ultrasound using B-mode/gray scaled imaging and Doppler spectral analysis and color flow was obtained of the right lower extremity. COMPARISON: None. HISTORY: ORDERING SYSTEM PROVIDED HISTORY: right leg swelling TECHNOLOGIST PROVIDED HISTORY: Reason for exam:->right leg swelling Reason for Exam: RT leg swelling x2 days Acuity: Acute Type of Exam: Initial FINDINGS: The visualized veins of the right lower extremity are patent and free of echogenic thrombus.  The veins demonstrate good compressibility with normal color flow study and spectral analysis. No evidence of DVT in the right lower extremity. EKG:  Twelve-lead EKG obtained at 1358 on 3 July 2021 and interpreted by me in the absence of a cardiologist.  There are nonspecific ST changes possibly related to rate and rhythm. Otherwise, there is no criteria ST elevation or reciprocal change. There are no hyperacute T wave changes. There is no sign of acute ischemia or infarction. This tracing shows  atrial fibrillation with rapid ventricular response. Rate and intervals are 107 beats per minute, AR interval indeterminant milliseconds, QRS duration 74 milliseconds, QTc interval 448 milliseconds, and R axis normal at 22 degrees. There is no acute change compared with the most recent EKG dated March 12, 2020 except where noted. Emergency department course:  Patient was initially seen by Dr. Venessa Mcconnell. Initial report is provided at shift change at approximately 2000. Report indicates patient was found by family on the floor. Family has close record television monitors and had not seen her through the morning. They estimate at least 5 to 6 hours. Patient denies falling. There is no reported loss of consciousness. Patient does not have any specific complaints, but lung sounds have been congested. Heart rate has been variable, as high as 130-150. Patient has been atrial fibrillation, and diltiazem bolus and infusion have been started. Initial contact has been made with the hospitalist service, but they would like multiple imaging studies completed before formally excepting the patient. Doppler ultrasound at this point does not show evidence of DVT. As of approximately 20-40, CT scan appears generally reassuring. There is a groundglass appearance to the lungs, so hospitalist has requested a rapid Covid swab. Consider atypical bacterial or viral infection. There is no clear evidence of pulmonary embolism.   There is a masslike opacity within the right lower lung that will require further evaluation. Blood pressures remain acceptable at about 075 mmHg systolic. Heart rate is averaging about 95 bpm.  As of approximately 2305, rapid Covid is negative. Hospitalist is notified of these additional findings. Clinical Impression:  1. Fall, initial encounter    2. Diarrhea, unspecified type    3. Shortness of breath    4. Hypoxia    5. Atrial fibrillation with RVR ([address]    6. Hyponatremia    7. Peripheral edema    8. Abnormal CT of the chest      Disposition referral (if applicable):  No follow-up provider specified.   Disposition medications (if applicable):  New Prescriptions    No medications on file     ED Provider Disposition Time  DISPOSITION  07/03/2021 07:58:09 PM        Patrick Noble MD  07/03/21 1494

## 2021-07-04 NOTE — PROGRESS NOTES
ATTENDING PHYSICIAN'S PROGRESS NOTES    Patient:  Demi Campo      Unit/Bed:017/017-01    YOB: 1950    MRN: [id number]     Acct: [de-identified]     Admit date: 7/3/2021    Patient Seen, Chart, Consults notes, Labs, Radiology studies reviewed. SUBJECTIVE:   Day 1 of stay with shortness of breath and generalized edema and most recent (in last 24 hours) has had no improvement in her symptomatology. Patient apparently has been nonadherent to her follow-up visits and therefore has run out of multiple medications. She did indicate the swelling is 1days old but judging from the fact that she may have had this for over 2 weeks. This was also confirmed by her daughter at the bedside. Patient told me bluntly \" that she is never went to quit drinking her beer for any Dr\". All other ROS negative except noted in HPI    Past, Family, Social History unchanged from admission. Diet:  ADULT DIET; Regular; Low Fat/Low Chol/High Fiber/2 gm Na;  Low Sodium (2 gm); 1500 ml    Medications:  Scheduled Meds:   allopurinol  100 mg Oral Daily    rivaroxaban  15 mg Oral Daily with breakfast    ipratropium  2 spray Each Nostril Q12H    sodium chloride flush  5-40 mL Intravenous 2 times per day    magnesium oxide  400 mg Oral BID    ipratropium-albuterol  1 ampule Inhalation Q4H WA    doxycycline hyclate  100 mg Oral 2 times per day    metOLazone  5 mg Oral Daily    midodrine  2.5 mg Oral TID WC    sodium chloride flush  5-40 mL Intravenous 2 times per day    thiamine  100 mg Oral Daily    multivitamin  1 tablet Oral Daily    [START ON 7/5/2021] bumetanide  1 mg Intravenous BID    folic acid  1 mg Oral Daily     Continuous Infusions:   sodium chloride      sodium chloride      [Held by provider] dilTIAZem Stopped (07/03/21 2100)     PRN Meds:sodium chloride flush, sodium chloride, ondansetron **OR** ondansetron, polyethylene glycol, acetaminophen **OR** acetaminophen, nitroGLYCERIN, sodium chloride flush, sodium chloride, LORazepam **OR** LORazepam **OR** LORazepam **OR** LORazepam **OR** LORazepam **OR** LORazepam **OR** LORazepam **OR** LORazepam    OBJECTIVE:    CBC:   Recent Labs     07/03/21  1440 07/04/21  0600   WBC 8.4 7.1   HGB 13.2 12.0*   PLT 99* 65*     BMP:    Recent Labs     07/03/21  1440 07/04/21  0600   * 123*   K 4.7 4.9   CL 88* 88*   CO2 25 29   BUN 13 15   CREATININE 0.6 0.6   GLUCOSE 84 85     Calcium:  Recent Labs     07/04/21  0600   CALCIUM 8.7     Ionized Calcium:No results for input(s): IONCA in the last 72 hours. Magnesium:  Recent Labs     07/04/21  0600   MG 1.8     Phosphorus:No results for input(s): PHOS in the last 72 hours. BNP:No results for input(s): BNP in the last 72 hours. Glucose:No results for input(s): POCGLU in the last 72 hours. HgbA1C: No results for input(s): LABA1C in the last 72 hours. INR:   Recent Labs     07/03/21  1440   INR 1.64     Hepatic:   Recent Labs     07/03/21  1440   ALKPHOS 170*   ALT 15   AST 80*   PROT 7.8   BILITOT 3.3*   LABALBU 3.6     Amylase and Lipase:No results for input(s): LACTA, AMYLASE in the last 72 hours. Lactic Acid: No results for input(s): LACTA in the last 72 hours. Troponin:   Recent Labs     07/03/21  1440 07/03/21  1515 07/04/21  0015 07/04/21  0615   CKTOTAL  --  556*  --   --    TROPONINT <0.010  --  <0.010 <0.010     BNP: No results for input(s): BNP in the last 72 hours. Lipids:   Recent Labs     07/04/21  0600   CHOL 111   TRIG 69   HDL 45     ABGs:   Lab Results   Component Value Date    PH 7.33 10/21/2015    O2SAT 96.8 10/21/2015       Radiology reports as per the Radiologist  Radiology: XR RADIUS ULNA RIGHT (2 VIEWS)    Result Date: 7/3/2021  EXAMINATION: TWO XRAY VIEWS OF THE RIGHT FOREARM; THREE XRAY VIEWS OF THE RIGHT HAND; 3 XRAY VIEWS OF THE LEFT WRIST 7/3/2021 5:40 pm; 7/3/2021 5:39 pm COMPARISON: None.  HISTORY: ORDERING SYSTEM PROVIDED HISTORY: Right arm pain, fall TECHNOLOGIST PROVIDED HISTORY: Reason for exam:->Right arm pain, fall Acuity: Acute Type of Exam: Initial Mechanism of Injury: Right arm pain, fall, best images due to pain and ability to move by pt; ORDERING SYSTEM PROVIDED HISTORY: fall, right hand pain TECHNOLOGIST PROVIDED HISTORY: Reason for exam:->fall, right hand pain Acuity: Acute Type of Exam: Initial Mechanism of Injury: fall, rt hand pain; ORDERING SYSTEM PROVIDED HISTORY: left wrist bruising TECHNOLOGIST PROVIDED HISTORY: Reason for exam:->left wrist bruising Acuity: Acute Type of Exam: Initial Additional signs and symptoms: left wrist bruising, prev fx 4 yrs ago with sx FOREARM FINDINGS: No acute fracture. No dislocation. No acute fracture or dislocation. WRIST FINDINGS: Suboptimal visualization of the scaphoid. No acute fracture given limitation. Suboptimal evaluation for dislocation without true lateral view. ORIF of the distal radial metadiaphysis noted. Widening of the scapholunate. IMPRESSION: Suboptimal visualization of the scaphoid. No acute fracture given limitation. HAND FINDINGS: No acute fracture. No dislocation. Contour abnormality of the distal radial metaphysis appears chronic on this nondedicated exam. IMPRESSION: No acute fracture or dislocation. XR WRIST LEFT (MIN 3 VIEWS)    Result Date: 7/3/2021  EXAMINATION: TWO XRAY VIEWS OF THE RIGHT FOREARM; THREE XRAY VIEWS OF THE RIGHT HAND; 3 XRAY VIEWS OF THE LEFT WRIST 7/3/2021 5:40 pm; 7/3/2021 5:39 pm COMPARISON: None.  HISTORY: ORDERING SYSTEM PROVIDED HISTORY: Right arm pain, fall TECHNOLOGIST PROVIDED HISTORY: Reason for exam:->Right arm pain, fall Acuity: Acute Type of Exam: Initial Mechanism of Injury: Right arm pain, fall, best images due to pain and ability to move by pt; ORDERING SYSTEM PROVIDED HISTORY: fall, right hand pain TECHNOLOGIST PROVIDED HISTORY: Reason for exam:->fall, right hand pain Acuity: Acute Type of Exam: Initial Mechanism of Injury: fall, rt hand pain; ORDERING SYSTEM PROVIDED HISTORY: left wrist bruising TECHNOLOGIST PROVIDED HISTORY: Reason for exam:->left wrist bruising Acuity: Acute Type of Exam: Initial Additional signs and symptoms: left wrist bruising, prev fx 4 yrs ago with sx FOREARM FINDINGS: No acute fracture. No dislocation. No acute fracture or dislocation. WRIST FINDINGS: Suboptimal visualization of the scaphoid. No acute fracture given limitation. Suboptimal evaluation for dislocation without true lateral view. ORIF of the distal radial metadiaphysis noted. Widening of the scapholunate. IMPRESSION: Suboptimal visualization of the scaphoid. No acute fracture given limitation. HAND FINDINGS: No acute fracture. No dislocation. Contour abnormality of the distal radial metaphysis appears chronic on this nondedicated exam. IMPRESSION: No acute fracture or dislocation. XR HAND RIGHT (MIN 3 VIEWS)    Result Date: 7/3/2021  EXAMINATION: TWO XRAY VIEWS OF THE RIGHT FOREARM; THREE XRAY VIEWS OF THE RIGHT HAND; 3 XRAY VIEWS OF THE LEFT WRIST 7/3/2021 5:40 pm; 7/3/2021 5:39 pm COMPARISON: None. HISTORY: ORDERING SYSTEM PROVIDED HISTORY: Right arm pain, fall TECHNOLOGIST PROVIDED HISTORY: Reason for exam:->Right arm pain, fall Acuity: Acute Type of Exam: Initial Mechanism of Injury: Right arm pain, fall, best images due to pain and ability to move by pt; ORDERING SYSTEM PROVIDED HISTORY: fall, right hand pain TECHNOLOGIST PROVIDED HISTORY: Reason for exam:->fall, right hand pain Acuity: Acute Type of Exam: Initial Mechanism of Injury: fall, rt hand pain; ORDERING SYSTEM PROVIDED HISTORY: left wrist bruising TECHNOLOGIST PROVIDED HISTORY: Reason for exam:->left wrist bruising Acuity: Acute Type of Exam: Initial Additional signs and symptoms: left wrist bruising, prev fx 4 yrs ago with sx FOREARM FINDINGS: No acute fracture. No dislocation. No acute fracture or dislocation. WRIST FINDINGS: Suboptimal visualization of the scaphoid.   No acute fracture given limitation. Suboptimal evaluation for dislocation without true lateral view. ORIF of the distal radial metadiaphysis noted. Widening of the scapholunate. IMPRESSION: Suboptimal visualization of the scaphoid. No acute fracture given limitation. HAND FINDINGS: No acute fracture. No dislocation. Contour abnormality of the distal radial metaphysis appears chronic on this nondedicated exam. IMPRESSION: No acute fracture or dislocation. CT HEAD WO CONTRAST    Result Date: 7/3/2021  EXAMINATION: CT OF THE HEAD WITHOUT CONTRAST; CT OF THE CERVICAL SPINE WITHOUT CONTRAST 7/3/2021 2:39 pm TECHNIQUE: CT of the head was performed without the administration of intravenous contrast. Dose modulation, iterative reconstruction, and/or weight based adjustment of the mA/kV was utilized to reduce the radiation dose to as low as reasonably achievable.; CT of the cervical spine was performed without the administration of intravenous contrast. Multiplanar reformatted images are provided for review. Dose modulation, iterative reconstruction, and/or weight based adjustment of the mA/kV was utilized to reduce the radiation dose to as low as reasonably achievable. COMPARISON: Head CT, 01/09/2016 Cervical spine CT, 07/03/2010 HISTORY: ORDERING SYSTEM PROVIDED HISTORY: Fall, hir head TECHNOLOGIST PROVIDED HISTORY: Has a \"code stroke\" or \"stroke alert\" been called? ->No Reason for exam:->Fall, hir head Decision Support Exception - unselect if not a suspected or confirmed emergency medical condition->Emergency Medical Condition (MA) Acuity: Acute Type of Exam: Initial Mechanism of Injury: Fall, hit head; ORDERING SYSTEM PROVIDED HISTORY: Fall, Neck pain TECHNOLOGIST PROVIDED HISTORY: Reason for exam:->Fall, Neck pain Decision Support Exception - unselect if not a suspected or confirmed emergency medical condition->Emergency Medical Condition (MA) Acuity: Acute Type of Exam: Initial Mechanism of Injury: Fall, Neck pain FINDINGS: HEAD CT: BRAIN/VENTRICLES:  No acute loss of the gray-white matter differentiation is identified to suggest acute or subacute infarct. No masses or hemorrhages within the brain parenchyma are found. No evidence of midline shift. There is moderate periventricular low-attenuation, compatible with chronic small vessel ischemic disease. The intracranial vasculature, including the dural venous sinuses, is within normal limits. ORBITS: No acute orbital abnormalities are identified. SINUSES: The visualized paranasal sinuses and mastoid air cells are clear. SOFT TISSUES/SKULL: The calvarium is intact. Extracranial soft tissues are unremarkable. CERVICAL SPINE CT: BONES/ALIGNMENT: No acute fracture or traumatic malalignment. DEGENERATIVE CHANGES: Multilevel degenerative disc and facet disease noted. No high-grade central canal stenosis is found. SOFT TISSUES: Prevertebral soft tissues are unremarkable. Extensive scarring and fibrosis noted within the upper lungs, not well evaluated. Head CT: No acute intracranial abnormality detected. Cervical spine CT: No acute fracture or traumatic malalignment. CT CERVICAL SPINE WO CONTRAST    Result Date: 7/3/2021  EXAMINATION: CT OF THE HEAD WITHOUT CONTRAST; CT OF THE CERVICAL SPINE WITHOUT CONTRAST 7/3/2021 2:39 pm TECHNIQUE: CT of the head was performed without the administration of intravenous contrast. Dose modulation, iterative reconstruction, and/or weight based adjustment of the mA/kV was utilized to reduce the radiation dose to as low as reasonably achievable.; CT of the cervical spine was performed without the administration of intravenous contrast. Multiplanar reformatted images are provided for review. Dose modulation, iterative reconstruction, and/or weight based adjustment of the mA/kV was utilized to reduce the radiation dose to as low as reasonably achievable.  COMPARISON: Head CT, 01/09/2016 Cervical spine CT, 07/03/2010 HISTORY: ORDERING SYSTEM PROVIDED HISTORY: Fall, [facility] head TECHNOLOGIST PROVIDED HISTORY: Has a \"code stroke\" or \"stroke alert\" been called? ->No Reason for exam:->Fall, hir head Decision Support Exception - unselect if not a suspected or confirmed emergency medical condition->Emergency Medical Condition (MA) Acuity: Acute Type of Exam: Initial Mechanism of Injury: Fall, hit head; ORDERING SYSTEM PROVIDED HISTORY: Fall, Neck pain TECHNOLOGIST PROVIDED HISTORY: Reason for exam:->Fall, Neck pain Decision Support Exception - unselect if not a suspected or confirmed emergency medical condition->Emergency Medical Condition (MA) Acuity: Acute Type of Exam: Initial Mechanism of Injury: Fall, Neck pain FINDINGS: HEAD CT: BRAIN/VENTRICLES:  No acute loss of the gray-white matter differentiation is identified to suggest acute or subacute infarct. No masses or hemorrhages within the brain parenchyma are found. No evidence of midline shift. There is moderate periventricular low-attenuation, compatible with chronic small vessel ischemic disease. The intracranial vasculature, including the dural venous sinuses, is within normal limits. ORBITS: No acute orbital abnormalities are identified. SINUSES: The visualized paranasal sinuses and mastoid air cells are clear. SOFT TISSUES/SKULL: The calvarium is intact. Extracranial soft tissues are unremarkable. CERVICAL SPINE CT: BONES/ALIGNMENT: No acute fracture or traumatic malalignment. DEGENERATIVE CHANGES: Multilevel degenerative disc and facet disease noted. No high-grade central canal stenosis is found. SOFT TISSUES: Prevertebral soft tissues are unremarkable. Extensive scarring and fibrosis noted within the upper lungs, not well evaluated. Head CT: No acute intracranial abnormality detected. Cervical spine CT: No acute fracture or traumatic malalignment.      XR CHEST PORTABLE    Result Date: 7/3/2021  EXAMINATION: ONE XRAY VIEW OF THE CHEST 7/3/2021 2:24 pm COMPARISON: 10/21/2015, 05/31/2013 HISTORY: ORDERING SYSTEM PROVIDED HISTORY: shortness of breath TECHNOLOGIST PROVIDED HISTORY: Reason for exam:->shortness of breath Acuity: Acute Type of Exam: Initial Additional signs and symptoms: sob, hx COPD FINDINGS: Stable pleural thickening at the right lung apex since 2015 but new compared to 2013. Stable mild cardiomegaly. Chronic reticular opacities. No focal lung consolidation. No pneumothorax or pleural effusion. COPD. No acute abnormality. COPD. Chronic pleural thickening at the right lung apex. Nonemergent CT follow-up could be considered. CTA PULMONARY W CONTRAST    Result Date: 7/3/2021  EXAMINATION: CTA OF THE CHEST 7/3/2021 6:08 pm TECHNIQUE: CTA of the chest was performed after the administration of intravenous contrast.  Multiplanar reformatted images are provided for review. MIP images are provided for review. Dose modulation, iterative reconstruction, and/or weight based adjustment of the mA/kV was utilized to reduce the radiation dose to as low as reasonably achievable. COMPARISON: Chest CT, 09/24/2009 HISTORY: ORDERING SYSTEM PROVIDED HISTORY: Shortness of breath TECHNOLOGIST PROVIDED HISTORY: Reason for exam:->Shortness of breath Decision Support Exception - unselect if not a suspected or confirmed emergency medical condition->Emergency Medical Condition (MA) Reason for Exam: Shortness of breath Acuity: Acute Type of Exam: Initial Mechanism of Injury: fall FINDINGS: Pulmonary Arteries: Pulmonary arteries are adequately opacified. No filling defects are identified within the pulmonary arteries to suggest pulmonary embolism. The main pulmonary artery is dilated, raising the possibility of pulmonary hypertension. Mediastinum: Visualized thyroid is unremarkable. There is increased lymph tissue identified within the tiffanie bilaterally, but without a well-defined lymph node. Amount of soft tissue is similar to mildly increased when compared to the previous exam from 2009. Esophagus is unremarkable.   Cardiac chambers are enlarged, especially on the right. No pericardial effusion is seen. Thoracic aorta is normal in caliber without evidence of dissection. Lungs/pleura: Scarring is noted within the lungs bilaterally, especially in the mid upper lungs, progressed when compared to the previous exam.  There is asymmetric volume loss seen within the right lung. Within the medial aspect of the right lower lobe, there is a new pleural based lentiform masslike opacity measuring maximally 2.8 x 1.4 cm. There are patchy areas of ground-glass opacity found within the lungs bilaterally. There is diffuse bronchial wall thickening. Filling defects are suggested within the lower lung airways, especially on the right. Airway narrowing is seen bilaterally, presumably secondary to the central fibrosis. No pneumothorax is identified. No pleural effusions are seen. Upper Abdomen: Limited imaging of the upper abdomen is unremarkable. Soft Tissues/Bones: No acute or suspicious bony abnormality. Chronic appearing compression deformities within the midthoracic spine seen. No evidence of pulmonary embolism or aortic dissection. Patchy ground-glass infiltrates seen within both lungs. These are very nonspecific and may be related to the fibrosis. Inflammatory/infectious pneumonitis should be considered as well, including both typical and atypical etiologies. Suspicious appearing lentiform masslike opacity within the medial aspect of the right lower lung, not visualized previously. After resolution of acute symptoms, further evaluation with PET-CT and/or with histopathologic correlation is recommended. Redemonstration of fibrosis within both lungs as well as within the mediastinum, leading to volume loss of the lungs, as well as bilateral airway narrowing. Overall, that appears progressed when compared to the previous exam from 2009.      VL DUP LOWER EXTREMITY VENOUS RIGHT    Result Date: 7/3/2021  EXAMINATION: DUPLEX VENOUS ULTRASOUND OF THE RIGHT LOWER EXTREMITY, 7/3/2021 4:48 pm TECHNIQUE: Duplex ultrasound using B-mode/gray scaled imaging and Doppler spectral analysis and color flow was obtained of the right lower extremity. COMPARISON: None. HISTORY: ORDERING SYSTEM PROVIDED HISTORY: right leg swelling TECHNOLOGIST PROVIDED HISTORY: Reason for exam:->right leg swelling Reason for Exam: RT leg swelling x2 days Acuity: Acute Type of Exam: Initial FINDINGS: The visualized veins of the right lower extremity are patent and free of echogenic thrombus. The veins demonstrate good compressibility with normal color flow study and spectral analysis. No evidence of DVT in the right lower extremity. Physical Exam:  Vitals: BP (!) 133/59   Pulse 94   Temp 99.8 °F (37.7 °C) (Oral)   Resp 16   Ht 5' 3\" (1.6 m)   Wt 162 lb 4.8 oz (73.6 kg)   SpO2 98%   BMI 28.75 kg/m²   24 hour intake/output:    Intake/Output Summary (Last 24 hours) at 7/4/2021 1807  Last data filed at 7/4/2021 1559  Gross per 24 hour   Intake 15 ml   Output 900 ml   Net -885 ml     Last 3 weights: Wt Readings from Last 3 Encounters:   07/04/21 162 lb 4.8 oz (73.6 kg)   04/29/20 149 lb (67.6 kg)   03/24/20 149 lb (67.6 kg)       General appearance - oriented to person, place, and time, in mild to moderate distress, chronically ill appearing and anasarca  HEENT: Normocephalic, Atraumatic, Conjuctiva pink, PERRL, Oral mucosa normal, Lips, teeth and gums normal, Trachea midline, Thyroid normal and No noted lymphadenopathy  Chest -diminished breath sounds at the bases with coarse crackles to the midlung zones with associated wheezes  Cardiovascular -S1-S2 present, irregular with murmurs and no gallop.   Abdomen - soft, nontender, nondistended, no masses or organomegaly   Neurological - Alert and oriented, Normal speech, No focal findings or movement disorder noted and Motor and sensory grossly normal bilaterally  Integumentary - Skin color, texture, turgor normal. No Rashes or lesions  Musculoskeletal -Bipedal pitting edema +2 up to the thighs with sacral edema and upper extremity edema, Full ROM times 4 extremities and No clubbing or cyanosis      DVT prophylaxis: [] Lovenox                                 [] SCDs                                 [] SQ Heparin                                 [] Encourage ambulation           [x] Already on Anticoagulation                 ASSESSMENT / PLAN :    Principal Problem:    Acute on chronic heart failure with preserved ejection fraction (HFpEF) ([facility]  Maintain patient on IV Bumex with augmentation with metolazone, restrict fluid and salt intake, continue with daily weighing, strict input and output monitoring, exercise as tolerated, oxygen supplementation. Scheduled for 2D echocardiogram with results pending. Patient counseled on follow-up visits      Acute hyponatremia  Etiology likely a combination of excessive beer drinking as well as CHF exacerbation with fluid overload. Work-up in progress. Monitor serum sodium closely. May need evaluation and follow-up by nephrology upon discharge. Active Problems:    COPD (chronic obstructive pulmonary disease) ([facility]  To maintain patient on nebulization with empiric p.o. antibiotic for now      H/O alcohol abuse  Patient admits daily alcohol use and indicates she is not willing to quit for any doctor. We will therefore maintain her on CIWA protocol whilst in-house. Continue with thiamine, folate and multivitamin. Hypertension, essential  Continue with current antihypertensive regimen and monitor for control. Chronic atrial fibrillation ([facility]  Patient apparently had presented with RVR of her A. fib, but had developed hypotension with Cardizem drip and therefore was taken off. Heart rate remained controlled. Continue maintaining on p.o. Cardizem in addition to midodrine to help augment blood pressures.       Shortness of breath  Due to combination of CHF and COPD exacerbation

## 2021-07-04 NOTE — ED NOTES
Returned from [facility]. Pt placed back on cardiac monitor and continuous pulse oximetry and blood pressure monitoring. Pt remain on oxygen at 2 L per NC.  Family at bedside     [facility]  07/03/21 7098

## 2021-07-04 NOTE — PROGRESS NOTES
Skin assessment done with this nurse and Particia Specter. Pt has abrasions on her left shoulder, bruising in the middle of her back and scattered bruising on BUE.

## 2021-07-04 NOTE — H&P
History and Physical      Name:  Bettina Cramer /Age/Sex: 1950  (69 y.o. female)   MRN & CSN:  [id number] & [id number] Admission Date/Time: 7/3/2021  1:42 PM   Location:   PCP: Adeel Farmer MD         History of present illness     Chief Complaint: Family states patient fell at home as she does not remember falling. Fall (Had a fall at home yesterday, family estimates between 5 and 6 p, and laid on floor all night, is swollen throughtout extremeties . Bettina Cramer is a 69 y.o.  female her medical history include COPD, A. fib, gout, tobacco abuse, and hypertension. She presents with complaints of a fall at home. The fall was unwitnessed. Patient lives with daughter and son-in-law. They were out on vacation. And noticed patient was not observed on home  camera for a while  . When they got back they found her on the floor. . Not quite sure how long she has fallen , and lying on the floor  She had swollen extremities with some bruises. She does not recollect falling. She denied striking her head against any object. She was conscious when she was brought to the ER. Her chief complaint was shortness of breath. Per EMS her initial oxygen saturation was in the 80s. Patient does not use home oxygen. She is also on anticoagulant for A. fib. On arrival initial evaluation indicated patient  Was in A. fib with RVR. And complaint of pain on her hands. Patient was started on Cardizem bolus and Cardizem drip  She was also put on oxygen and telemetry. She is being admitted as an inpatient for the control of the A. fib with RVR. And also to address the fall and work-up as to what may have led to her fall. Patient has also had reduced oral intake. With lower extremity edema.       Assessment and Plan:     # Fall at home  Pain on the right hand and wrist  Elevated CK Total [id number] IU/L   Initial imaging was not concerning for intracranial hemorrhage  X-ray of right forearm  FOREARM FINDINGS: No acute fracture. No dislocation. Impression:  No acute fracture or dislocation. WRIST FINDINGS:   Suboptimal visualization of the scaphoid. No acute fracture given   limitation. Suboptimal evaluation for dislocation without true lateral view. ORIF of the distal radial metadiaphysis noted. Widening of the scapholunate. IMPRESSION:   Suboptimal visualization of the scaphoid. No acute fracture given limitation. HAND FINDINGS:   No acute fracture. No dislocation. Contour abnormality of the distal radial   metaphysis appears chronic on this nondedicated exam.   IMPRESSION:   No acute fracture or dislocation. Orthostatic BP, fall precaution, PT OT consultation,  Pain control. # Shortness of breath  This may be related to patient COPD, CHF,  Exacerbation of A. Fib. CTA pulmonary with contrastImpression:  No evidence of pulmonary embolism or aortic dissection. Oxygen supplementation and management of underlying chronic condition. # COPD Exacerbation  Shortness of breath, wheezing, hypoxia. CT chest W/O contrast   Impression:  No acute abnormality. COPD. Chronic pleural thickening at the right lung apex. Nonemergent CT follow-up   could be considered   Supplemental oxygen, home COPD medication  Breathing treatment, albuterol/DuoNeb. Low-dose steroids. # CHF   Left ventricular function appears preserved  Last nuclear stress test October 2020 had EF 60%  Elevated proBNPPro-BNP1,827High PG/ML   Bilateral lower extremity edema  Mild diuresis, cardiology consult.       Afib RVR  Shortness of breath, palpitation,  TSH level  6.8  Patient 50 mg Xarelto at home to continue same  In ER had Cardizem bolus, followed with Cardizem drip  Patient converted to normal sinus rhythm, and blood pressure dropped  Cardizem was held, was given some fluid for blood pressure to be in the low 497O systolic  [id number] Score for Atrial Fibrillation Stroke Risk   Risk   Factors  Component Value   C CHF Yes 1 H HTN Yes 1   A2 Age >= 76 No,  (74 y.o.) 0   D DM No 0   S2 Prior Stroke/TIA No 0   V Vascular Disease No 0   A Age 74-69 Yes,  (74 y.o.) 1   Sc Sex female 1    [id number]  Score  4   Score last updated 7/4/21 9:18 AM EDT    Click here for a link to the UpToDate guideline \"Atrial Fibrillation: Anticoagulation therapy to prevent embolization    Disclaimer: Risk Score calculation is dependent on accuracy of patient problem list and past encounter diagnosis. #Hyponatremia  Sodium of 124  Replace sodium and repeat lab in the a.m    #Hypomagnesemia  Magnesium 1.7  Replace magnesium and recheck lab in the a.m. # Gout  This is a chronic condition, not in exacerbation  Continue allopurinol. Medications:   Medications:    Infusions:    dilTIAZem Stopped (07/03/21 2100)     PRN Meds:      Current Facility-Administered Medications:     dilTIAZem 100 mg in dextrose 5 % 100 mL infusion ([facility]), 5-15 mg/hr, Intravenous, Continuous, Lucy Marie MD, Stopped at 07/03/21 2100    Current Outpatient Medications:     digoxin (LANOXIN) 125 MCG tablet, Take 1 tablet by mouth daily, Disp: 30 tablet, Rfl: 5    rivaroxaban (XARELTO) 15 MG TABS tablet, Take 1 tablet by mouth daily (with breakfast), Disp: 30 tablet, Rfl: 5    allopurinol (ZYLOPRIM) 100 MG tablet, TAKE 1 TABLET BY MOUTH DAILY, Disp: 30 tablet, Rfl: 0    Blood Pressure Monitor KIT, 1 kit by Does not apply route once for 1 dose, Disp: 1 kit, Rfl: 0    ipratropium (ATROVENT) 0.03 % nasal spray, 2 sprays by Each Nostril route every 12 hours, Disp: 1 Bottle, Rfl: 3     Review of Systems     GENERAL:  Denies fever, chills, or  night sweats,   EYES:  Denies recent visual changes. ENT:  Denies ear pain, hearing loss or tinnitus  RESP:  Endorsed  cough, dyspnea, or wheezing. CV:  Denies any chest pain with exertion or at rest, endorsed  palpitations, syncope,  And  edema.   GI:  Denies any dysphagia, nausea, vomiting, abdominal pain,  changes in bowel habit,  MUSCULOSKELETAL:  Denies any joint swelling,  or loss of range of motion. NEURO:  Denies any headaches, tremors,  numbness or tingling. PSYCH:  Denies any sleeping problems,. HEME/LYMPHATIC/IMMUNO:  Denies , bruising,   ENDO:  Denies any heat or cold intolerance,       Objective:   No intake or output data in the 24 hours ending 07/03/21 2354   Vitals:   Vitals:    07/03/21 2345   BP: (!) 95/52   Pulse: 96   Resp: 21   Temp:    SpO2: 99%     Physical Exam:     GEN: Awake female, sitting in semi faye position   in Bed   in no  Acute  distress. EYES: Pupils are equally round. No scleral erythema, discharge, . HENT: Mucous membranes are moist. External ears and nose appear normal.   NECK: Supple, no apparent thyromegaly or masses. RESP:  Symmetric chest movement while on  Supplemental  Oxygen . CARDIO/VASC: Irregularly irregular rhythm, lower extremity edema   GI:  Active bowl sound, non distended, non tender  Rectal exam deferred. :  Carmichael catheter is not present. MSK: No gross joint deformities. SKIN: Normal coloration, warm, dry. NEURO: Cranial nerves appear grossly intact, normal speech, no lateralizing weakness. PSYCH: Awake, alert, oriented x 4. Affect appropriate. Past Medical History:      Past Medical History:   Diagnosis Date    Acute respiratory failure ([address] 10/20/2015    After cataract surgery, unable to wean her off    Atrial fibrillation ([address]     Cataract extraction status of right eye 08/18/2015    Chronic atrial fibrillation ([address] 3/12/2020    New onset atrial fibriallation Metoprolol ER 25 mg daily Refer to cardiologist        Colonoscopy refused     discussed in 1/16    Congestive heart failure due to high blood pressure ([address] 7/3/2021    COPD (chronic obstructive pulmonary disease) ([address]     seen Dr Maria Fernanda Stevens    Depression 8/25/2015    resolved    Gout     H/O 24 hour EKG monitoring 04/29/2020    Basic rhythm is sinus.  One episode of atrial fib RVR Connections:     Frequency of Communication with Friends and Family:     Frequency of Social Gatherings with Friends and Family:     Attends Rastafari Services:     Active Member of Clubs or Organizations:     Attends Club or Organization Meetings:     Marital Status:    Intimate Partner Violence:     Fear of Current or Ex-Partner:     Emotionally Abused:     Physically Abused:     Sexually Abused:        Electronically signed by CRISTIAN Rubin CNP on 7/3/2021 at 11:54 PM

## 2021-07-04 NOTE — PROGRESS NOTES
Family states patient lives with daughter and son-in-law. Daughter states that patient is alone while they are at work and ambulates without walker. Patient does not have home healthcare. Daughter states patient has had an increase in falls but attributes this to alcohol consumption stating patient has 2-3 drinks per day.

## 2021-07-04 NOTE — ED NOTES
Rapid COVID obtained and taken to lab. Family upset about continued wait.  Daughter and son-in-law left to get something to eat     Saeid Segovia RN  07/03/21 6618

## 2021-07-04 NOTE — PROGRESS NOTES
Dr. Nisreen Foss stated that cardiology consult could be done on Monday July 5, 2021 as patient needs seen before discharge but does not need seen today.

## 2021-07-05 NOTE — PROGRESS NOTES
Occupational Therapy   Occupational Therapy Initial Assessment  Date: 2021   Patient Name: Jakob Koo  MRN: [id number]     : 1950    Date of Service: 2021    Discharge Recommendations:  IP Rehab, Home with assist PRN, Continue to assess pending progress  OT Equipment Recommendations  Equipment Needed: No    Assessment   Performance deficits / Impairments: Decreased ADL status; Decreased endurance;Decreased strength  Assessment: Pt is a 68 yo female who presents with decreased endurance and increased SOB upon activity on 2L of O2. She would benefit from occupational therapy to address education on energy conservation and adaptive techniques to maximize indep with functional mobility and ADLs. Treatment Diagnosis: Decreased endurance  Prognosis: Good  Decision Making: Medium Complexity  OT Education: OT Role;Energy Conservation; Family Education;Plan of Care  REQUIRES OT FOLLOW UP: Yes  Activity Tolerance  Activity Tolerance: Treatment limited secondary to medical complications (free text)  Activity Tolerance: Limited by increased SOB following toileting and functional mobility in room. Safety Devices  Safety Devices in place: Yes  Type of devices: All fall risk precautions in place;Gait belt;Left in chair;Patient at risk for falls;Call light within reach; Chair alarm in place  Restraints  Initially in place: No           Patient Diagnosis(es): The primary encounter diagnosis was Fall, initial encounter. Diagnoses of Diarrhea, unspecified type, Shortness of breath, Hypoxia, Atrial fibrillation with RVR ([address] Hyponatremia, Peripheral edema, and Abnormal CT of the chest were also pertinent to this visit.      has a past medical history of Acute respiratory failure ([address] Atrial fibrillation ([address] Cataract extraction status of right eye, Chronic atrial fibrillation ([address] Colonoscopy refused, Congestive heart failure due to high blood pressure ([address] COPD (chronic obstructive pulmonary disease) ([address] Depression, Gout, H/O 24 hour EKG monitoring, H/O echocardiogram, History of nuclear stress test, Hypertension, Mammogram declined, Pneumonia, and Tobacco abuse disorder. has a past surgical history that includes fracture surgery (Left, ~2009 - 2010); Hysterectomy (In her late 19's); Cataract removal with implant (10/20/2015); and Dental surgery (2016). Treatment Diagnosis: Decreased endurance      Restrictions  Restrictions/Precautions  Restrictions/Precautions: Fall Risk, General Precautions  Position Activity Restriction  Other position/activity restrictions: 2L of O2, Tele    Subjective   General  Patient assessed for rehabilitation services?: Yes  Family / Caregiver Present: Yes (Pt's daughter)  Subjective  Subjective: Pt reports \"I'm feeling alright\"  Per daughter they were on vacation and noticed on their home monitors that they didn't see the Pt. Called and no answer and came home and found Pt on the ground . Pt reports she didn't fall however has brusining.   Daughter reports Pt has been more SOB lately  General Comment  Comments: Pt presents awake supine in bed with HOB elevated and daughter present  Patient Currently in Pain: Denies  Vital Signs  Patient Currently in Pain: Denies  Oxygen Therapy  SpO2: 98 %  O2 Flow Rate (L/min): 2 L/min  Social/Functional History  Social/Functional History  Lives With:  (Daughter and son in law)  Type of Home: House  Home Layout: Two level, Performs ADL's on one level, Able to Live on Main level with bedroom/bathroom  Home Access: Stairs to enter without rails  Entrance Stairs - Number of Steps: 2 steps in front, 1 step in back  Bathroom Shower/Tub:  (Pt sponge bathes)  Bathroom Toilet: Standard  Home Equipment: Rolling walker (Pt and daughter reports she doesn't use the walker)  Receives Help From: Family  ADL Assistance: Independent (Pt is indep but she gets SOB)  Homemaking Responsibilities: Yes  Meal Prep Responsibility: Secondary (Pt fixes her breakfast/lunch and daughter makes dinners)  Laundry Responsibility: No (Laundry is in basement so daughter does this)  Cleaning Responsibility: Secondary (Pt will do dishes)  Ambulation Assistance: Independent (Pt has a RW but daughter report she doesn't use it)  Transfer Assistance: Independent  Active : No  Leisure & Hobbies: Pt watches TV, soaps and takes her dog out  Additional Comments: Pt is not normally on O2 at home but reports lately Pt has been more SOB during ADLs and fixing her meals/doing dishes lately       Objective   Vision: Within Functional Limits  Hearing: Within functional limits    Orientation  Overall Orientation Status: Within Functional Limits  Observation/Palpation  Observation: Pt awake supine in bed,  HOB elevated with heart monitor and 2L of O2  Balance  Sitting Balance: Independent  Standing Balance: Stand by assistance  Standing Balance  Activity: Pt amb in room around bed to bathroom then across room to sink and back around bed to sit in chair.   Pt amb with RW and demo no LOB  Toilet Transfers  Toilet - Technique: Ambulating  Equipment Used: Raised toilet seat with rails  Toilet Transfer: Stand by assistance  ADL  Grooming: Stand by assistance (Pt stood at sink to wash hands)  UE Bathing: Stand by assistance  LE Bathing: Stand by assistance  UE Dressing: Stand by assistance  LE Dressing: Stand by assistance  Toileting: Stand by assistance (Pt completed toileting with SBA for trf and ja care)  Additional Comments: Based on observation of Pt performance or current functional mobility, strength, endurance status  Tone RUE  RUE Tone: Normotonic  Tone LUE  LUE Tone: Normotonic  Coordination  Movements Are Fluid And Coordinated: Yes     Bed mobility  Supine to Sit: Modified independent  Scooting: Modified independent  Comment: HOB elevated and bedrail assist  Transfers  Stand Step Transfers: Stand by assistance  Sit to stand: Stand by assistance  Stand to sit: Stand by assistance  Vision - Basic Assessment  Prior Vision: No visual deficits  Cognition  Overall Cognitive Status: WFL        Sensation  Overall Sensation Status: WNL (per Pt report)        LUE AROM (degrees)  LUE AROM : WFL  RUE AROM (degrees)  RUE AROM : WFL  LUE Strength  Gross LUE Strength: WFL  RUE Strength  Gross RUE Strength: WFL                   Plan   Plan  Times per week: 3+  Current Treatment Recommendations: Balance Training, Functional Mobility Training, Endurance Training, Patient/Caregiver Education & Training, Safety Education & Training, Self-Care / ADL, Equipment Evaluation, Education, & procurement, Strengthening    G-Code     OutComes Score                                                  AM-PAC Score             Goals  Short term goals  Time Frame for Short term goals: Until DC or goals met  Short term goal 1: Pt will complete trfs mod I w/o LOB  Short term goal 2: Pt will complete UE sponge bathing/dressing mod I following energy conservation techniques  Short term goal 3: Pt will complete LE sponge bathing/dressing mod I following energy conservation techniques  Short term goal 4: Pt will complete simple therax/homemaking ax 5+ min following energy conservation techniques after education  Short term goal 5: Pt will complete 10+ min of BUE therex to increase endurance for functional mobility and ADLs. Patient Goals   Patient goals : To return home       Therapy Time   Individual Concurrent Group Co-treatment   Time In 1215         Time Out 1247         Minutes 32         Timed Code Treatment Minutes: [address] [facility] OMKAR Young OTR/DAMARIS [id number]

## 2021-07-05 NOTE — CONSULTS
CARDIOLOGY INITIAL VIST    Samara France  [birth year]      Referring physician:   Todd Miller MD     Primary care physician:  Steve Abreu MD    Reason for consultation: CHF        History of present illness: 65-year-old female admitted on July 3 with increasing shortness of breath and leg swelling. .  According to history she has stopped taking her medications noted emergency room history and physical.  She denies that and says she takes the medication regularly. She has been seen and followed by my associate Dr. Jerome Thomas. She quit smoking 8 months ago continues to drink 2 beers a day. She has chronic atrial fibrillation. Apparently she had a fall and could not get up on the day of admission and she is being monitored by family members with postsurgical tremors and the brought her to the hospital.  She reported to the emergency room physician that she had watery diarrhea but denies any fever or chills nausea vomiting. She was started on Bumex 1 mg IV twice a day on admission and Aldactone 25 mg added yesterday and she has diuresed well and had hypotension yesterday. She was on IV Cardizem now switched to oral Cardizem for rate control. REVIEW OF SYSTEMS: 3-4 fever chills cough positive for dyspnea on exertion or leg swelling. Negative for chest pain or palpitations or syncope. Negative for TIA or CVA. Past medical history:  has a past medical history of Acute respiratory failure ([address] Atrial fibrillation ([address] Cataract extraction status of right eye, Chronic atrial fibrillation ([address] Colonoscopy refused, Congestive heart failure due to high blood pressure ([address] COPD (chronic obstructive pulmonary disease) ([address] Depression, Gout, H/O 24 hour EKG monitoring, H/O echocardiogram, History of nuclear stress test, Hypertension, Mammogram declined, Pneumonia, and Tobacco abuse disorder.     Past surgical history:  has a past surgical history that includes fracture surgery (Left, ~2009 - 2010); Hysterectomy (In her late 19's); Cataract removal with implant (10/20/2015); and Dental surgery (2016). Social History:  reports that she quit smoking about 18 months ago. Her smoking use included cigarettes. She has a 7.75 pack-year smoking history. She has never used smokeless tobacco. She reports current alcohol use of about 4.0 - 6.0 standard drinks of alcohol per week. She reports that she does not use drugs. Family history: family history includes Cancer in her brother, brother, and mother; Diabetes in her sister; Heart Disease in her father; Kidney Disease in her father.     Allergies   Allergen Reactions    Aspirin Rash       Current Facility-Administered Medications   Medication Dose Route Frequency Provider Last Rate Last Admin    bumetanide (BUMEX) tablet 2 mg  2 mg Oral Daily Leah Bonilla MD        spironolactone (ALDACTONE) tablet 25 mg  25 mg Oral Daily Carla Teran MD   25 mg at 07/06/21 8830    midodrine (PROAMATINE) tablet 5 mg  5 mg Oral TID  Carla Teran MD   5 mg at 07/06/21 3745    allopurinol (ZYLOPRIM) tablet 100 mg  100 mg Oral Daily Garland Chang APRN - CNP   100 mg at 07/06/21 8736    rivaroxaban (XARELTO) tablet 15 mg  15 mg Oral Daily with breakfast Garland Chang APRN - CNP   15 mg at 07/06/21 0821    ipratropium (ATROVENT) 0.03 % nasal spray 2 spray  2 spray Each Nostril Q12H Garland Chang APRN - CNP   2 spray at 07/04/21 0830    sodium chloride flush 0.9 % injection 5-40 mL  5-40 mL Intravenous 2 times per day Mal Martinet, APRN - CNP   10 mL at 07/04/21 0831    sodium chloride flush 0.9 % injection 5-40 mL  5-40 mL Intravenous PRN Garland Chang, APRN - CNP   10 mL at 07/04/21 1325    0.9 % sodium chloride infusion  25 mL Intravenous PRN Garland Chang, APRN - CNP        ondansetron (ZOFRAN-ODT) disintegrating tablet 4 mg  4 mg Oral Q8H PRN GarlandCRISTIAN Pina CNP        Or    ondansetron (ZOFRAN) injection 4 mg  4 mg Intravenous Q6H PRN Garland Akaeze, APRN - CNP        polyethylene glycol (GLYCOLAX) packet 17 g  17 g Oral Daily PRN Garland Akaeze, APRN - CNP        acetaminophen (TYLENOL) tablet 650 mg  650 mg Oral Q6H PRN Garland Akaeze, APRN - CNP   650 mg at 07/04/21 1321    Or    acetaminophen (TYLENOL) suppository 650 mg  650 mg Rectal Q6H PRN Garland Akaeze, APRN - CNP        nitroGLYCERIN (NITROSTAT) SL tablet 0.4 mg  0.4 mg Sublingual Q5 Min PRN Garland Akaeze, APRN - CNP        magnesium oxide (MAG-OX) tablet 400 mg  400 mg Oral BID Garland Akaeze, APRN - CNP   400 mg at 07/06/21 6840    ipratropium-albuterol (DUONEB) nebulizer solution 1 ampule  1 ampule Inhalation Q4H WA Jana Kevin MD   1 ampule at 07/06/21 6269    doxycycline hyclate (VIBRA-TABS) tablet 100 mg  100 mg Oral 2 times per day Jana Kevin MD   100 mg at 07/06/21 0702    sodium chloride flush 0.9 % injection 5-40 mL  5-40 mL Intravenous 2 times per day Jana Kevin MD   10 mL at 07/05/21 0847    sodium chloride flush 0.9 % injection 5-40 mL  5-40 mL Intravenous PRN Jana Kevin MD        0.9 % sodium chloride infusion  25 mL Intravenous PRN Jana Kevin MD        LORazepam (ATIVAN) tablet 1 mg  1 mg Oral Q1H PRN Jana Kevin MD        Or    LORazepam (ATIVAN) injection 1 mg  1 mg Intravenous Q1H PRN Jana Kevin MD        Or    LORazepam (ATIVAN) tablet 2 mg  2 mg Oral Q1H PRN Jana Kevin MD        Or    LORazepam (ATIVAN) injection 2 mg  2 mg Intravenous Q1H PRN Jana Kevin MD        Or    LORazepam (ATIVAN) tablet 3 mg  3 mg Oral Q1H PRN Jana Kevin MD        Or    LORazepam (ATIVAN) injection 3 mg  3 mg Intravenous Q1H PRN Jana Kevin MD        Or    LORazepam (ATIVAN) tablet 4 mg  4 mg Oral Q1H PRN Jana Kevin MD        Or    LORazepam (ATIVAN) injection 4 mg  4 mg Intravenous Q1H PRN Jana Kevin MD        thiamine mononitrate tablet 100 mg  100 mg Oral Daily Jana Kevin MD 100 mg at 07/06/21 0545    therapeutic multivitamin-minerals 1 tablet  1 tablet Oral Daily Mercedes Uriarte MD   1 tablet at 80/96/47 9764    folic acid (FOLVITE) tablet 1 mg  1 mg Oral Daily Mercedes Uriarte MD   1 mg at 07/06/21 7630    dilTIAZem (CARDIZEM) tablet 30 mg  30 mg Oral 3 times per day Mercedes Uriarte MD   30 mg at 07/06/21 7639    [Held by provider] dilTIAZem 100 mg in dextrose 5 % 100 mL infusion ([facility])  5-15 mg/hr Intravenous Continuous Errol Lu MD   Stopped at 07/03/21 2100     Physical Examination:      Vitals:    07/06/21 0621 07/06/21 0645 07/06/21 0722 07/06/21 0815   BP: (!) 121/59   (!) 117/55   Pulse: 87   80   Resp:    16   Temp:    98.2 °F (36.8 °C)   TempSrc:    Oral   SpO2:   97% 98%   Weight:  161 lb 3.2 oz (73.1 kg)     Height:         Wt Readings from Last 3 Encounters:   07/06/21 161 lb 3.2 oz (73.1 kg)   04/29/20 149 lb (67.6 kg)   03/24/20 149 lb (67.6 kg)       Intake/Output Summary (Last 24 hours) at 7/6/2021 6392  Last data filed at 7/6/2021 0901  Gross per 24 hour   Intake 240 ml   Output 3150 ml   Net -2910 ml     General appearance: Normally built and nourished female in no apparent distress    Eyes: She wears glasses. ENT: Unremarkable    NECK: No JVP or thyromegaly    Cardiovascular: Auscultation: Normal S1 and S2. No significant murmurs noted. Peripheral pulses: Radial pulses are 1+    Respiratory:  Respiratory effort is normal.  Breath sounds are diminished markedly bilaterally without any wheezing or rhonchi    Extremities: 1-2+ pitting edema noted on legs and ankle reportedly improved compared to day of admission    SKIN: Warm and well perfused, no pallor or cyanosis    Abdomen:  No masses or tenderness. No organomegaly noted. Neurologic:  Oriented to time, place, and person and non-anxious. No focal neurological deficit noted.     Psychiatric: Judgment/Insight and Mood is normal    Lab Review   Recent Labs     07/06/21  0700   WBC 6.4   HGB 11.0* HCT 32.6*   PLT 92*      Recent Labs     07/05/21  0600   *   K 3.7   CL 87*   CO2 34*   PHOS 3.0   BUN 20   CREATININE 0.9     Recent Labs     07/05/21  0600   AST 56*   ALT 19   BILITOT 1.6*   ALKPHOS 133*     Lab Results   Component Value Date    HDL 45 07/04/2021     Recent Labs     07/03/21  1440 07/04/21  0015 07/04/21  0615   TROPONINT <0.010 <0.010 <0.010     Lab Results   Component Value Date    PROBNP 1,827 (H) 07/03/2021     No results found for: BNP  Lab Results   Component Value Date    INR 4.64 07/05/2021    PROTIME 58.9 (H) 07/05/2021     OTHER TEST RESULTS REVIEWED    EKG:  Atrial fibrillation with rapid ventricular response rate is 77 bpm,  ST & T wave abnormality, consider inferior ischemia   Abnormal ECG     Chest Xray this admission:    No acute abnormality.  COPD.       Chronic pleural thickening at the right lung apex.  Nonemergent CT follow-up   could be considered. CT chest on 7/3/2021 reported  No evidence of pulmonary embolism or aortic dissection.       Patchy ground-glass infiltrates seen within both lungs.  These are very   nonspecific and may be related to the fibrosis.  Inflammatory/infectious   pneumonitis should be considered as well, including both typical and atypical   etiologies.       Suspicious appearing lentiform masslike opacity within the medial aspect of   the right lower lung, not visualized previously.  After resolution of acute   symptoms, further evaluation with PET-CT and/or with histopathologic   correlation is recommended.       Redemonstration of fibrosis within both lungs as well as within the   mediastinum, leading to volume loss of the lungs, as well as bilateral airway   narrowing.  Overall, that appears progressed when compared to the previous   exam from 2009. ECHO:   Normal left ventricle structure and function. EF is 50-55 % . Mild bilateral atrial dilatation. Mild mitral regurgitation is present.    Aortic sclerosis with moderate aortic and tricuspid regurgitation is noted. Right ventricular systolic pressure of 51 mm Hg consistent with moderate   pulmonary hypertension. No evidence of pericardial effusion. Assessment and Recommendations: Active Hospital Problems    Diagnosis Date Noted    Acute hyponatremia [E87.1] 07/04/2021    Acute on chronic heart failure with preserved ejection fraction (HFpEF) ([facility]) [I50.33] 07/04/2021    Shortness of breath [R06.02] 07/03/2021    Gout of foot [M10.9] 07/03/2021    Chronic atrial fibrillation ([facility]) [I48.20] 03/12/2020    Hypertension, essential [I10] 01/05/2017    H/O alcohol abuse [F10.11] 01/19/2016    COPD (chronic obstructive pulmonary disease) ([facility]) [J44.9]        Decrease Bumex to 2 mg daily orally once a day and continue Aldactone 25 mg daily and Cardizem 30 mg p.o. every 8 hours for rate control. Agree with magnesium supplementation. Continue anticoagulation with Xarelto. Evaluate as tolerated. She is counseled for compliance to medications and follow-up with Dr. Roselinda Hamman 1 to 2 weeks after discharge and follow-up with PCP closely. Discussed with Dr. Westley Umaña hospitalist.  Discussed with nursing charge to give the patient. Jose L Mosqueda MD, 7/6/2021 9:52 AM     Please note this report has been produced using speech recognition software and may contain errors related to that system including errors in grammar, punctuation, and spelling, as well as words and phrases that may be inappropriate.  If there are any questions or concerns please feel free to contact the dictating provider for clarification

## 2021-07-05 NOTE — PLAN OF CARE
Problem: Falls - Risk of:  Goal: Will remain free from falls  Description: Will remain free from falls  7/5/2021 0901 by Ayse Foley RN  Outcome: Ongoing  7/4/2021 1941 by Roseann Ruiz RN  Outcome: Ongoing  Goal: Absence of physical injury  Description: Absence of physical injury  7/5/2021 0901 by Ayse Foley RN  Outcome: Ongoing  7/4/2021 1941 by Roseann Ruiz RN  Outcome: Ongoing     Problem: Breathing Pattern - Ineffective:  Goal: Ability to achieve and maintain a regular respiratory rate will improve  Description: Ability to achieve and maintain a regular respiratory rate will improve  7/5/2021 0901 by Ayse Foley RN  Outcome: Ongoing  7/4/2021 1941 by Roseann Ruiz RN  Outcome: Ongoing     Problem: Pain:  Goal: Pain level will decrease  Description: Pain level will decrease  7/5/2021 0901 by Ayse Foley RN  Outcome: Ongoing  7/4/2021 1941 by Roseann Ruiz RN  Outcome: Ongoing  Goal: Control of acute pain  Description: Control of acute pain  7/5/2021 0901 by Ayse Foley RN  Outcome: Ongoing  7/4/2021 1941 by Roseann Ruiz RN  Outcome: Ongoing  Goal: Control of chronic pain  Description: Control of chronic pain  7/5/2021 0901 by Ayse Foley RN  Outcome: Ongoing  7/4/2021 1941 by Roseann Ruiz RN  Outcome: Ongoing

## 2021-07-05 NOTE — PROGRESS NOTES
ATTENDING PHYSICIAN'S PROGRESS NOTES    Patient:  Bettina Cramer      Unit/Bed:017/017-01    YOB: 1950    MRN: [id number]     Acct: [de-identified]     Admit date: 7/3/2021    Patient Seen, Chart, Consults notes, Labs, Radiology studies reviewed. SUBJECTIVE:   Day 2 of stay with shortness of breath and generalized edema and most recent (in last 24 hours) has had improvement in her symptomatology. Patient reviewed and case discussed with Dr. Mark Inman, with recommendations noted and appreciated. Patient to be seen by Dr. Mark Inman tomorrow for further recommendations. She still has generalized swelling. Right upper quadrant abdominal ultrasound negative for liver cirrhosis. Patient has abnormal liver enzymes. All other ROS negative except noted in HPI    Past, Family, Social History unchanged from admission. Diet:  ADULT DIET; Regular; Low Fat/Low Chol/High Fiber/2 gm Na;  Low Sodium (2 gm); 1500 ml    Medications:  Scheduled Meds:   bumetanide  2 mg Intravenous BID    spironolactone  25 mg Oral Daily    allopurinol  100 mg Oral Daily    rivaroxaban  15 mg Oral Daily with breakfast    ipratropium  2 spray Each Nostril Q12H    sodium chloride flush  5-40 mL Intravenous 2 times per day    magnesium oxide  400 mg Oral BID    ipratropium-albuterol  1 ampule Inhalation Q4H WA    doxycycline hyclate  100 mg Oral 2 times per day    metOLazone  5 mg Oral Daily    midodrine  2.5 mg Oral TID WC    sodium chloride flush  5-40 mL Intravenous 2 times per day    thiamine  100 mg Oral Daily    multivitamin  1 tablet Oral Daily    folic acid  1 mg Oral Daily    dilTIAZem  30 mg Oral 3 times per day     Continuous Infusions:   sodium chloride      sodium chloride      [Held by provider] dilTIAZem Stopped (07/03/21 2100)     PRN Meds:sodium chloride flush, sodium chloride, ondansetron **OR** ondansetron, polyethylene glycol, acetaminophen **OR** acetaminophen, nitroGLYCERIN, sodium chloride flush, sodium chloride, LORazepam **OR** LORazepam **OR** LORazepam **OR** LORazepam **OR** LORazepam **OR** LORazepam **OR** LORazepam **OR** LORazepam    OBJECTIVE:    CBC:   Recent Labs     07/03/21  1440 07/04/21  0600 07/05/21  0600   WBC 8.4 7.1 6.7   HGB 13.2 12.0* 11.1*   PLT 99* 65* 95*     BMP:    Recent Labs     07/03/21  1440 07/04/21  0600 07/05/21  0600   * 123* 124*   K 4.7 4.9 3.7   CL 88* 88* 87*   CO2 25 29 34*   BUN 13 15 20   CREATININE 0.6 0.6 0.9   GLUCOSE 84 85 100*     Calcium:  Recent Labs     07/05/21  0600   CALCIUM 8.7     Ionized Calcium:  Recent Labs     07/05/21  0600   IONCA 1.21     Magnesium:  Recent Labs     07/05/21  0600   MG 1.7*     Phosphorus:  Recent Labs     07/05/21  0600   PHOS 3.0     BNP:No results for input(s): BNP in the last 72 hours. Glucose:No results for input(s): POCGLU in the last 72 hours. HgbA1C: No results for input(s): LABA1C in the last 72 hours. INR:   Recent Labs     07/05/21  1330   INR 4.64     Hepatic:   Recent Labs     07/05/21  0600   ALKPHOS 133*   ALT 19   AST 56*   PROT 6.3*   BILITOT 1.6*   LABALBU 3.1*     Amylase and Lipase:  Recent Labs     07/05/21  0600   AMYLASE 13*     Lactic Acid: No results for input(s): LACTA in the last 72 hours. Troponin:   Recent Labs     07/03/21  1440 07/03/21  1515 07/04/21  0015 07/04/21  0615 07/05/21  0600   CKTOTAL  --  556*  --   --  131   TROPONINT <0.010  --  <0.010 <0.010  --      BNP: No results for input(s): BNP in the last 72 hours. Lipids:   Recent Labs     07/04/21  0600   CHOL 111   TRIG 69   HDL 45     ABGs:   Lab Results   Component Value Date    PH 7.33 10/21/2015    O2SAT 96.8 10/21/2015       Radiology reports as per the Radiologist  Radiology: XR RADIUS ULNA RIGHT (2 VIEWS)    Result Date: 7/3/2021  EXAMINATION: TWO XRAY VIEWS OF THE RIGHT FOREARM; THREE XRAY VIEWS OF THE RIGHT HAND; 3 XRAY VIEWS OF THE LEFT WRIST 7/3/2021 5:40 pm; 7/3/2021 5:39 pm COMPARISON: None.  HISTORY: ORDERING SYSTEM PROVIDED HISTORY: Right arm pain, fall TECHNOLOGIST PROVIDED HISTORY: Reason for exam:->Right arm pain, fall Acuity: Acute Type of Exam: Initial Mechanism of Injury: Right arm pain, fall, best images due to pain and ability to move by pt; ORDERING SYSTEM PROVIDED HISTORY: fall, right hand pain TECHNOLOGIST PROVIDED HISTORY: Reason for exam:->fall, right hand pain Acuity: Acute Type of Exam: Initial Mechanism of Injury: fall, rt hand pain; ORDERING SYSTEM PROVIDED HISTORY: left wrist bruising TECHNOLOGIST PROVIDED HISTORY: Reason for exam:->left wrist bruising Acuity: Acute Type of Exam: Initial Additional signs and symptoms: left wrist bruising, prev fx 4 yrs ago with sx FOREARM FINDINGS: No acute fracture. No dislocation. No acute fracture or dislocation. WRIST FINDINGS: Suboptimal visualization of the scaphoid. No acute fracture given limitation. Suboptimal evaluation for dislocation without true lateral view. ORIF of the distal radial metadiaphysis noted. Widening of the scapholunate. IMPRESSION: Suboptimal visualization of the scaphoid. No acute fracture given limitation. HAND FINDINGS: No acute fracture. No dislocation. Contour abnormality of the distal radial metaphysis appears chronic on this nondedicated exam. IMPRESSION: No acute fracture or dislocation. XR WRIST LEFT (MIN 3 VIEWS)    Result Date: 7/3/2021  EXAMINATION: TWO XRAY VIEWS OF THE RIGHT FOREARM; THREE XRAY VIEWS OF THE RIGHT HAND; 3 XRAY VIEWS OF THE LEFT WRIST 7/3/2021 5:40 pm; 7/3/2021 5:39 pm COMPARISON: None.  HISTORY: ORDERING SYSTEM PROVIDED HISTORY: Right arm pain, fall TECHNOLOGIST PROVIDED HISTORY: Reason for exam:->Right arm pain, fall Acuity: Acute Type of Exam: Initial Mechanism of Injury: Right arm pain, fall, best images due to pain and ability to move by pt; ORDERING SYSTEM PROVIDED HISTORY: fall, right hand pain TECHNOLOGIST PROVIDED HISTORY: Reason for exam:->fall, right hand pain Acuity: Acute Type of Exam: Initial Mechanism of Injury: fall, rt hand pain; ORDERING SYSTEM PROVIDED HISTORY: left wrist bruising TECHNOLOGIST PROVIDED HISTORY: Reason for exam:->left wrist bruising Acuity: Acute Type of Exam: Initial Additional signs and symptoms: left wrist bruising, prev fx 4 yrs ago with sx FOREARM FINDINGS: No acute fracture. No dislocation. No acute fracture or dislocation. WRIST FINDINGS: Suboptimal visualization of the scaphoid. No acute fracture given limitation. Suboptimal evaluation for dislocation without true lateral view. ORIF of the distal radial metadiaphysis noted. Widening of the scapholunate. IMPRESSION: Suboptimal visualization of the scaphoid. No acute fracture given limitation. HAND FINDINGS: No acute fracture. No dislocation. Contour abnormality of the distal radial metaphysis appears chronic on this nondedicated exam. IMPRESSION: No acute fracture or dislocation. XR HAND RIGHT (MIN 3 VIEWS)    Result Date: 7/3/2021  EXAMINATION: TWO XRAY VIEWS OF THE RIGHT FOREARM; THREE XRAY VIEWS OF THE RIGHT HAND; 3 XRAY VIEWS OF THE LEFT WRIST 7/3/2021 5:40 pm; 7/3/2021 5:39 pm COMPARISON: None. HISTORY: ORDERING SYSTEM PROVIDED HISTORY: Right arm pain, fall TECHNOLOGIST PROVIDED HISTORY: Reason for exam:->Right arm pain, fall Acuity: Acute Type of Exam: Initial Mechanism of Injury: Right arm pain, fall, best images due to pain and ability to move by pt; ORDERING SYSTEM PROVIDED HISTORY: fall, right hand pain TECHNOLOGIST PROVIDED HISTORY: Reason for exam:->fall, right hand pain Acuity: Acute Type of Exam: Initial Mechanism of Injury: fall, rt hand pain; ORDERING SYSTEM PROVIDED HISTORY: left wrist bruising TECHNOLOGIST PROVIDED HISTORY: Reason for exam:->left wrist bruising Acuity: Acute Type of Exam: Initial Additional signs and symptoms: left wrist bruising, prev fx 4 yrs ago with sx FOREARM FINDINGS: No acute fracture. No dislocation. No acute fracture or dislocation.  WRIST FINDINGS: Suboptimal visualization of the scaphoid. No acute fracture given limitation. Suboptimal evaluation for dislocation without true lateral view. ORIF of the distal radial metadiaphysis noted. Widening of the scapholunate. IMPRESSION: Suboptimal visualization of the scaphoid. No acute fracture given limitation. HAND FINDINGS: No acute fracture. No dislocation. Contour abnormality of the distal radial metaphysis appears chronic on this nondedicated exam. IMPRESSION: No acute fracture or dislocation. CT HEAD WO CONTRAST    Result Date: 7/3/2021  EXAMINATION: CT OF THE HEAD WITHOUT CONTRAST; CT OF THE CERVICAL SPINE WITHOUT CONTRAST 7/3/2021 2:39 pm TECHNIQUE: CT of the head was performed without the administration of intravenous contrast. Dose modulation, iterative reconstruction, and/or weight based adjustment of the mA/kV was utilized to reduce the radiation dose to as low as reasonably achievable.; CT of the cervical spine was performed without the administration of intravenous contrast. Multiplanar reformatted images are provided for review. Dose modulation, iterative reconstruction, and/or weight based adjustment of the mA/kV was utilized to reduce the radiation dose to as low as reasonably achievable. COMPARISON: Head CT, 01/09/2016 Cervical spine CT, 07/03/2010 HISTORY: ORDERING SYSTEM PROVIDED HISTORY: Fall, [facility] head TECHNOLOGIST PROVIDED HISTORY: Has a \"code stroke\" or \"stroke alert\" been called? ->No Reason for exam:->Fall, hir head Decision Support Exception - unselect if not a suspected or confirmed emergency medical condition->Emergency Medical Condition (MA) Acuity: Acute Type of Exam: Initial Mechanism of Injury: Fall, hit head; ORDERING SYSTEM PROVIDED HISTORY: Fall, Neck pain TECHNOLOGIST PROVIDED HISTORY: Reason for exam:->Fall, Neck pain Decision Support Exception - unselect if not a suspected or confirmed emergency medical condition->Emergency Medical Condition (MA) Acuity: Acute Type of Exam: Initial Mechanism of Injury: Fall, Neck pain FINDINGS: HEAD CT: BRAIN/VENTRICLES:  No acute loss of the gray-white matter differentiation is identified to suggest acute or subacute infarct. No masses or hemorrhages within the brain parenchyma are found. No evidence of midline shift. There is moderate periventricular low-attenuation, compatible with chronic small vessel ischemic disease. The intracranial vasculature, including the dural venous sinuses, is within normal limits. ORBITS: No acute orbital abnormalities are identified. SINUSES: The visualized paranasal sinuses and mastoid air cells are clear. SOFT TISSUES/SKULL: The calvarium is intact. Extracranial soft tissues are unremarkable. CERVICAL SPINE CT: BONES/ALIGNMENT: No acute fracture or traumatic malalignment. DEGENERATIVE CHANGES: Multilevel degenerative disc and facet disease noted. No high-grade central canal stenosis is found. SOFT TISSUES: Prevertebral soft tissues are unremarkable. Extensive scarring and fibrosis noted within the upper lungs, not well evaluated. Head CT: No acute intracranial abnormality detected. Cervical spine CT: No acute fracture or traumatic malalignment. CT CERVICAL SPINE WO CONTRAST    Result Date: 7/3/2021  EXAMINATION: CT OF THE HEAD WITHOUT CONTRAST; CT OF THE CERVICAL SPINE WITHOUT CONTRAST 7/3/2021 2:39 pm TECHNIQUE: CT of the head was performed without the administration of intravenous contrast. Dose modulation, iterative reconstruction, and/or weight based adjustment of the mA/kV was utilized to reduce the radiation dose to as low as reasonably achievable.; CT of the cervical spine was performed without the administration of intravenous contrast. Multiplanar reformatted images are provided for review. Dose modulation, iterative reconstruction, and/or weight based adjustment of the mA/kV was utilized to reduce the radiation dose to as low as reasonably achievable.  COMPARISON: Head CT, 01/09/2016 Cervical spine CT, 07/03/2010 HISTORY: ORDERING SYSTEM PROVIDED HISTORY: Fall, hir head TECHNOLOGIST PROVIDED HISTORY: Has a \"code stroke\" or \"stroke alert\" been called? ->No Reason for exam:->Fall, hir head Decision Support Exception - unselect if not a suspected or confirmed emergency medical condition->Emergency Medical Condition (MA) Acuity: Acute Type of Exam: Initial Mechanism of Injury: Fall, hit head; ORDERING SYSTEM PROVIDED HISTORY: Fall, Neck pain TECHNOLOGIST PROVIDED HISTORY: Reason for exam:->Fall, Neck pain Decision Support Exception - unselect if not a suspected or confirmed emergency medical condition->Emergency Medical Condition (MA) Acuity: Acute Type of Exam: Initial Mechanism of Injury: Fall, Neck pain FINDINGS: HEAD CT: BRAIN/VENTRICLES:  No acute loss of the gray-white matter differentiation is identified to suggest acute or subacute infarct. No masses or hemorrhages within the brain parenchyma are found. No evidence of midline shift. There is moderate periventricular low-attenuation, compatible with chronic small vessel ischemic disease. The intracranial vasculature, including the dural venous sinuses, is within normal limits. ORBITS: No acute orbital abnormalities are identified. SINUSES: The visualized paranasal sinuses and mastoid air cells are clear. SOFT TISSUES/SKULL: The calvarium is intact. Extracranial soft tissues are unremarkable. CERVICAL SPINE CT: BONES/ALIGNMENT: No acute fracture or traumatic malalignment. DEGENERATIVE CHANGES: Multilevel degenerative disc and facet disease noted. No high-grade central canal stenosis is found. SOFT TISSUES: Prevertebral soft tissues are unremarkable. Extensive scarring and fibrosis noted within the upper lungs, not well evaluated. Head CT: No acute intracranial abnormality detected. Cervical spine CT: No acute fracture or traumatic malalignment.      XR CHEST PORTABLE    Result Date: 7/3/2021  EXAMINATION: ONE XRAY VIEW OF THE CHEST 7/3/2021 2:24 pm the previous exam from 2009. Esophagus is unremarkable. Cardiac chambers are enlarged, especially on the right. No pericardial effusion is seen. Thoracic aorta is normal in caliber without evidence of dissection. Lungs/pleura: Scarring is noted within the lungs bilaterally, especially in the mid upper lungs, progressed when compared to the previous exam.  There is asymmetric volume loss seen within the right lung. Within the medial aspect of the right lower lobe, there is a new pleural based lentiform masslike opacity measuring maximally 2.8 x 1.4 cm. There are patchy areas of ground-glass opacity found within the lungs bilaterally. There is diffuse bronchial wall thickening. Filling defects are suggested within the lower lung airways, especially on the right. Airway narrowing is seen bilaterally, presumably secondary to the central fibrosis. No pneumothorax is identified. No pleural effusions are seen. Upper Abdomen: Limited imaging of the upper abdomen is unremarkable. Soft Tissues/Bones: No acute or suspicious bony abnormality. Chronic appearing compression deformities within the midthoracic spine seen. No evidence of pulmonary embolism or aortic dissection. Patchy ground-glass infiltrates seen within both lungs. These are very nonspecific and may be related to the fibrosis. Inflammatory/infectious pneumonitis should be considered as well, including both typical and atypical etiologies. Suspicious appearing lentiform masslike opacity within the medial aspect of the right lower lung, not visualized previously. After resolution of acute symptoms, further evaluation with PET-CT and/or with histopathologic correlation is recommended. Redemonstration of fibrosis within both lungs as well as within the mediastinum, leading to volume loss of the lungs, as well as bilateral airway narrowing. Overall, that appears progressed when compared to the previous exam from 2009.      VL DUP LOWER EXTREMITY VENOUS RIGHT    Result Date: 7/3/2021  EXAMINATION: DUPLEX VENOUS ULTRASOUND OF THE RIGHT LOWER EXTREMITY, 7/3/2021 4:48 pm TECHNIQUE: Duplex ultrasound using B-mode/gray scaled imaging and Doppler spectral analysis and color flow was obtained of the right lower extremity. COMPARISON: None. HISTORY: ORDERING SYSTEM PROVIDED HISTORY: right leg swelling TECHNOLOGIST PROVIDED HISTORY: Reason for exam:->right leg swelling Reason for Exam: RT leg swelling x2 days Acuity: Acute Type of Exam: Initial FINDINGS: The visualized veins of the right lower extremity are patent and free of echogenic thrombus. The veins demonstrate good compressibility with normal color flow study and spectral analysis. No evidence of DVT in the right lower extremity. Physical Exam:  Vitals: /64   Pulse 71   Temp 98.1 °F (36.7 °C) (Oral)   Resp 16   Ht 5' 3\" (1.6 m)   Wt 161 lb 4.8 oz (73.2 kg)   SpO2 98%   BMI 28.57 kg/m²   24 hour intake/output:    Intake/Output Summary (Last 24 hours) at 7/5/2021 1514  Last data filed at 7/5/2021 1159  Gross per 24 hour   Intake --   Output 2250 ml   Net -2250 ml     Last 3 weights: Wt Readings from Last 3 Encounters:   07/05/21 161 lb 4.8 oz (73.2 kg)   04/29/20 149 lb (67.6 kg)   03/24/20 149 lb (67.6 kg)       General appearance - oriented to person, place, and time, in mild to moderate distress, chronically ill appearing and anasarca  HEENT: Normocephalic, Atraumatic, Conjuctiva pink, PERRL, Oral mucosa normal, Lips, teeth and gums normal, Trachea midline, Thyroid normal and No noted lymphadenopathy  Chest -diminished breath sounds at the bases with coarse crackles to the midlung zones with associated wheezes  Cardiovascular -S1-S2 present, irregular with murmurs and no gallop.   Abdomen - soft, nontender, nondistended, no masses or organomegaly   Neurological - Alert and oriented, Normal speech, No focal findings or movement disorder noted and Motor and sensory grossly normal bilaterally  Integumentary - Skin color, texture, turgor normal. No Rashes or lesions  Musculoskeletal -Bipedal pitting edema +2 up to the thighs with sacral edema and upper extremity edema, Full ROM times 4 extremities and No clubbing or cyanosis      DVT prophylaxis: [] Lovenox                                 [] SCDs                                 [] SQ Heparin                                 [] Encourage ambulation           [x] Already on Anticoagulation                 ASSESSMENT / PLAN :    Principal Problem:    Acute on chronic heart failure with preserved ejection fraction (HFpEF) ([facility]  Maintain patient on IV Bumex with augmentation with Aldactone, restrict fluid and salt intake, continue with daily weighing, strict input and output monitoring, exercise as tolerated, oxygen supplementation. Scheduled for 2D echocardiogram with results discussed with Dr. Abby Kilgore. Patient counseled on follow-up visits      Acute hyponatremia  Etiology likely a combination of excessive beer drinking as well as CHF exacerbation with fluid overload. Work-up in progress. Monitor serum sodium closely. May need evaluation and follow-up by nephrology upon discharge. Alcoholic hepatitis  Work-up negative for cirrhosis, patient will need follow-up with gastroenterology upon discharge. Active Problems:    COPD (chronic obstructive pulmonary disease) ([facility]  To maintain patient on nebulization with empiric p.o. antibiotic for now      H/O alcohol abuse  Patient admits daily alcohol use and indicates she is not willing to quit for any doctor. We will therefore maintain her on CIWA protocol whilst in-house. Continue with thiamine, folate and multivitamin. Hypertension, essential  Continue with current antihypertensive regimen and monitor for control.       Chronic atrial fibrillation ([facility]  Patient apparently had presented with RVR of her A. fib, but had developed hypotension with Cardizem drip and therefore was taken off.    Heart rate remained controlled. Continue maintaining on p.o. Cardizem in addition to midodrine to help augment blood pressures. Shortness of breath  Due to combination of CHF and COPD exacerbation and management as indicated above. Gout of foot  Maintain patient on home regimen as reordered. Resolved Problems:    * No resolved hospital problems. *    Patient is currently stable, continue with diuresis as recommended by cardiology consult, maintain patient in-house overnight and reassess in the morning. A.m. labs.     Electronically signed by Candida Valle MD on 7/5/2021 at 3:14 PM    [facility] [place] Hospitalist

## 2021-07-06 NOTE — PROGRESS NOTES
Occupational Therapy Treatment Note      Name: Azeb Jenkins MRN: [id number] :   1950   Date:  2021   Admission Date: 7/3/2021 Room:  [address] [place] [address]     Primary Problem:  Acute on chronic heart failure,COPD    Restrictions/Precautions:  Restrictions/Precautions  Restrictions/Precautions: Fall Risk, General Precautions     Communication with other providers:  Ok to see per nurse    Subjective:  Patient states:  \"I'm hoping to go home tomorrow\"  Pain:  (location, type, intensity) 0/10    Objective:    Observation:  Pt awake sitting reclined in chair  Objective Measures:  Pt seen for ADL and energy conservation training    Treatment, including education:    Transfers:  Sit to stand: SBA  Stand to sit: SBA  Sit to supine: SBA  Toilet trf: SBA    ADLs:  Pt complete sponge bath seated in chair  UE bathing: min A for back  LE bathing: min A for buttocks and lower portion of BLE  UE dressing: min A to marybeth/doff gown  Toileting: SBA    Therapeutic activity:  Pt completed sponge bath seated in chair. Pt then amb from chair into bathroom using RW with SBA ~8ft. Following toileting amb from bathroom to EOB 8ft with RW SBA. Pt tolerated well and noted no increased SOB during ADLs and functional mobility on 2L of O2. Assessment / Impression:    Patient's tolerance of treatment:  Good  Adverse Reaction: None  Significant change in status and impact:  None  Barriers to improvement: Increased SOB      Plan for Next Session:    Continue current POC    Time in:  1035  Time out:  1100  Timed treatment minutes:  25  Total treatment time:  25      Electronically signed by:    Hipolito Paulson.  KAMARI Carr [id number]  2021, 11:03 AM

## 2021-07-06 NOTE — PROGRESS NOTES
Progress Note      Subjective:   Chief complaint: CHF, shortness of breath    Interval History:   68 yo female admitted with CHF, afib,  EtOH cirrhosis, thrombocytopenia, and hyponatremia. She was diuresed with IV Bumex (5200cc cumulatrive since admission) and states breathing is better, less SOB. States her leg edema is greatly improved. She states no CP, no N, no V;  She has pain in left arm from area of IV CT scan dye infiltration with blister formation. She states increased bruisability but no active bleeding. States after fell at home, \"knot on back\" still sore. Her blood pressures have been very labile with treatment of CHF over past 48 hours and was started on midodrine. Cardiology consulted and case discussed this AM.   She was changed from IV to oral bumex. Family is at bedside during exam.    Review of systems:   As above    Past medical history, surgical history, family history and social history reviewed and unchanged compared to H&P earlier this admission.     Medications:   Scheduled Meds:   bumetanide  2 mg Oral Daily    spironolactone  25 mg Oral Daily    midodrine  5 mg Oral TID WC    allopurinol  100 mg Oral Daily    [Held by provider] rivaroxaban  15 mg Oral Daily with breakfast    ipratropium  2 spray Each Nostril Q12H    sodium chloride flush  5-40 mL Intravenous 2 times per day    magnesium oxide  400 mg Oral BID    ipratropium-albuterol  1 ampule Inhalation Q4H WA    doxycycline hyclate  100 mg Oral 2 times per day    sodium chloride flush  5-40 mL Intravenous 2 times per day    thiamine  100 mg Oral Daily    multivitamin  1 tablet Oral Daily    folic acid  1 mg Oral Daily    dilTIAZem  30 mg Oral 3 times per day     Continuous Infusions:   sodium chloride      sodium chloride         Objective:     Vital Signs  Temp: 98.2 °F (36.8 °C)  Pulse: 80  Resp: 16  BP: (!) 94/58  SpO2: 100 %  O2 Device: Nasal cannula  O2 Flow Rate (L/min): 2 L/min    Vital signs reviewed in electronic charts. Physical exam    Constitutional:  Well developed, well nourished, no acute distress. Eyes:  PERRL, conjunctiva normal, EOMI. No scleral icterus  Respiratory:  No respiratory distress, decreased  breath sounds, bibase course rales, no rhonchi, no wheeze; no CVA tenderness;   Cardiovascular:  Irreg/irreg, rate controlled, +  murmur. GI:  Soft, nondistended, normal bowel sounds, nontender, no organomegaly, no mass. :  No costovertebral angle tenderness. Musculoskeletal: 1+ pitting edema, no tenderness,  Patient is moving all extremities. Left forearm with 1.5cm serous filled blister from IV dye infiltration. No signs of celluitis - not hot to touch; Large hematoma (approx 8cm) just right of T12 from fall prior to admission. Tender, not indurated, no signs of cellulitis, no palpable rib crepitus with inspiration. Integument:  Well hydrated, no rash. Bruises to forearms and back ; left form arm with blister as described above; no cellulitis  Neurologic:  Alert & oriented x 3,  no focal deficits noted. Strength is equal throughout. Psychiatric:  Speech and behavior appropriate. No signs of DT's;  CIWA scale 0    Results:     Lab Results   Component Value Date    WBC 6.4 07/06/2021    HGB 11.0 (L) 07/06/2021    HCT 32.6 (L) 07/06/2021    MCV 97.9 07/06/2021    PLT 92 (L) 07/06/2021       Lab Results   Component Value Date     07/06/2021    K 3.4 07/06/2021    CL 83 07/06/2021    CO2 40 07/06/2021    BUN 24 07/06/2021    CREATININE 1.0 07/06/2021    GLUCOSE 94 07/06/2021    CALCIUM 9.1 07/06/2021      ECHO 7/4/21  Conclusions      Summary   Normal left ventricle structure and function. EF is 50-55 % . Mild bilateral atrial dilatation. Mild mitral regurgitation is present. Aortic sclerosis with moderate aortic and tricuspid regurgitation is noted. Right ventricular systolic pressure of 51 mm Hg consistent with moderate   pulmonary hypertension.    No evidence of pericardial effusion. Assessment and Plan: Active Hospital Problems    Diagnosis Date Noted    Fall [W19. XXXA]     Acute hyponatremia [E87.1] 07/04/2021    Acute on chronic heart failure with preserved ejection fraction (HFpEF) ([facility] [I50.33] 07/04/2021    Shortness of breath [R06.02] 07/03/2021    Gout of foot [M10.9] 07/03/2021    Chronic atrial fibrillation (HCC) [I48.20] 03/12/2020    Hypertension, essential [I10] 01/05/2017    H/O alcohol abuse [F10.11] 01/19/2016    COPD (chronic obstructive pulmonary disease) (HCC) [J44.9]      Acute on chronic probable combination systolic/diastolic heart failure with preserved ejection fraction (HFpEF) ([facility]     Card consulted. Changed from IV bumex to po;  Spironolactone; restrict fluid and salt intake, discussed with patient and family - low salt substitute and need for buying a scale for daily weighing, strict input and output monitoring,  Unable to tolerate betablocker due to COPD;  May need to consider CHF clinic in [place]     acute afib RVR (resolved) and now Chronic atrial fibrillation [facility])  Patient apparently had presented with RVR of her A. fib, but had developed hypotension with Cardizem drip and therefore was taken off and placed on oral short acting cardizem. Heart rate remained controlled but hypotensive. Midodrinine started for BP support. Eliquis on hold as patient INR is elevated and thrombocytopenia from liver disease      Pulmonary HTN - moderate - probable type 3 due to lung disease - cardiology consulted. Treat underlying causes (CHF, COPD)        COPD (chronic obstructive pulmonary disease) ([facility] with exacerbation  To maintain patient on nebulization with empiric p.o. antibiotic(doxy) for now. No need for steroids yet. Hyponatremia due to spironolactone and prior EtOH use (beer) - monitor        H/O alcohol abuse  Patient admits daily alcohol use and indicates she is not willing to quit for any doctor.   We will therefore maintain her on CIWA protocol whilst in-house. Continue with thiamine, folate and multivitamin. Alcoholic hepatitis  Work-up negative for cirrhosis, patient will need follow-up with gastroenterology upon discharge. Thrombocytopenia due to EtOH use - hold ASA and anticoagulation; consider steroids if  Drop below 70         Hypertension, essential with hypotension  Continue with current antihypertensive regimen and monitor for control.      Left forearm blister without cellulitis due to IV Dye infiltrate - wound consult. No signs of cellulitis; ice pack/elevate     history of  Gout of foot with no current flare-  Maintain patient on home regimen as reordered. ABNORMAL CT SCAN -  Compared [id number], there is a NEW RLL lentiform suspicious  masslike opacity 2.8 x1.4cm - needs outpatient PET scan and pulm follow up for possible biospy     Disposition - patient wants to go home but agreed to stay 1 more day while transitioned from IV to po treatment of CHF. Has agreed to home health. Possible d/c tomorrow if remains hemodynamically stable.     Prognosis: guarded    DVT prophylaxis: eliquis on hold due to elevated INR/thrombocytopenia    Electronically signed by Yann Anderson DO on 7/6/2021 at 5:19 PM

## 2021-07-06 NOTE — PROGRESS NOTES
Cardizem 30 mg p.o. is due at this time. Blood pressure is 97/52, heart rate 90. Vicki Magana notified and asked if to give the Cardizem as BP's have been running low today. New order to give 250ml bolus and give Cardizem.    Jose Gonzalez RN  9:32 PM

## 2021-07-06 NOTE — PROGRESS NOTES
Paul Cr RN wound care nurse at bedside to look at patient's blister on the left arm from IV infiltration of CT contrast. Juan Jose suggested to leave the blister open to air, keep the limb elevated and to use an ice pack.

## 2021-07-06 NOTE — PROGRESS NOTES
Physical Therapy    Facility/Department: [facility] UNIT  Initial Assessment    NAME: Bettina Cramer  : 1950  MRN: [id number]    Date of Service: 2021    Discharge Recommendations:  24 hour supervision or assist, Home with assist PRN, Home with Home health PT   PT Equipment Recommendations  Equipment Needed: No    Assessment   Body structures, Functions, Activity limitations: Decreased endurance;Decreased strength;Decreased functional mobility   Assessment: Patient is a pleasant 69year old female who presented to hospital with shortness of breath and generalized edema. Patient displays poor endurance with ambulation and functional mobility and is currently requiring 2L 02/min nasal cannula. Pt will benefit from continued PT while inpatient to address this. Prognosis: Good  Decision Making: Medium Complexity  History: see below  Exam: see below  Clinical Presentation: evolving  PT Education: Goals;Transfer Training;Disease Specific Education;PT Role;Energy Conservation; Functional Mobility Training;Plan of Care;General Safety;Gait Training  Barriers to Learning: None  REQUIRES PT FOLLOW UP: Yes  Activity Tolerance  Activity Tolerance: Patient limited by endurance       Patient Diagnosis(es): The primary encounter diagnosis was Fall, initial encounter. Diagnoses of Diarrhea, unspecified type, Shortness of breath, Hypoxia, Atrial fibrillation with RVR ([address] Hyponatremia, Peripheral edema, and Abnormal CT of the chest were also pertinent to this visit.      has a past medical history of Acute respiratory failure ([address] Atrial fibrillation ([address] Cataract extraction status of right eye, Chronic atrial fibrillation ([address] Colonoscopy refused, Congestive heart failure due to high blood pressure (HCC), COPD (chronic obstructive pulmonary disease) ([address] Depression, Gout, H/O 24 hour EKG monitoring, H/O echocardiogram, History of nuclear stress test, Hypertension, Mammogram declined, Pneumonia, and Tobacco abuse disorder. has a past surgical history that includes fracture surgery (Left, ~2009 - 2010); Hysterectomy (In her late 19's); Cataract removal with implant (10/20/2015); and Dental surgery (2016).     Restrictions  Restrictions/Precautions  Restrictions/Precautions: Fall Risk, General Precautions  Position Activity Restriction  Other position/activity restrictions: 2L of O2, Tele  Vision/Hearing  Vision: Within Functional Limits  Hearing: Within functional limits     Subjective  General  Chart Reviewed: Yes  Patient assessed for rehabilitation services?: Yes  Family / Caregiver Present: No  Follows Commands: Within Functional Limits  Subjective  Subjective: \"I get to go home tomorrow\"  Pain Screening  Patient Currently in Pain: No  Vital Signs  Patient Currently in Pain: No       Orientation  Orientation  Overall Orientation Status: Within Functional Limits  Social/Functional History  Social/Functional History  Lives With: Daughter  Type of Home: House  Home Layout: Two level, Performs ADL's on one level, Able to Live on Main level with bedroom/bathroom  Home Access: Stairs to enter without rails  Entrance Stairs - Number of Steps: 2 steps in front, 1 step in back  Bathroom Shower/Tub:  (Pt sponge bathes)  Bathroom Toilet: Standard  Home Equipment: Rolling walker (Pt and daughter reports she doesn't use the walker)  Receives Help From: Family  ADL Assistance: Independent (Pt is indep but she gets SOB)  Homemaking Responsibilities: Yes  Meal Prep Responsibility: Secondary (Pt fixes her breakfast/lunch and daughter makes dinners)  Laundry Responsibility: No (Laundry is in basement so daughter does this)  Cleaning Responsibility: Secondary (Pt will do dishes)  Ambulation Assistance: Independent (Pt has a RW but daughter report she doesn't use it)  Transfer Assistance: Independent  Active : No  Leisure & Hobbies: Pt watches TV, soaps and takes her dog out  Additional Comments: Pt is not normally on O2 at home but reports lately Pt has been more SOB during ADLs and fixing her meals/doing dishes lately  Cognition - WFL       Objective     Observation/Palpation  Observation: Pt resting supine in bed,  HOB elevated with heart monitor and 2L of O2    AROM RLE (degrees)  RLE AROM: WFL  AROM LLE (degrees)  LLE AROM : WFL  Strength RLE  Comment: Grossly 4+/5 MMT throughout  Strength LLE  Comment: Grossly 4+/5 MMT throughout  Tone RLE  RLE Tone: Normotonic  Tone LLE  LLE Tone: Normotonic  Motor Control  Gross Motor?: WFL  Sensation  Overall Sensation Status: WNL (per Pt report)  Bed mobility  Supine to Sit: Modified independent  Scooting: Modified independent  Comment: HOB elevated slightly to 30º  Transfers  Sit to Stand: Supervision  Stand to sit: Supervision  Comment: Commode transfer with supervision, good eccentric lowering to toilet  Ambulation  Ambulation?: Yes  Ambulation 1  Surface: level tile  Device: Rolling Walker  Assistance: Supervision  Quality of Gait: good gait speed, reciprocal heel to toe pattern, equal step length bilat  Distance: 20-30 ft distances within room x2  Comments: Pt maintains 96% Sp02 on 2L throughout mobility, no SOB. HR in 70's. /60 in chair after ambulation. Stairs/Curb  Stairs?: No     Balance  Posture: Fair  Sitting - Static: Good  Sitting - Dynamic: Good  Standing - Static: Good  Standing - Dynamic: Fair;+  Comments: Pt able to stand at sink and complete dynamic balance task of hand hygiene without LOB or difficulty        Plan   Plan  Times per week: 3+ Mon-Sat  Current Treatment Recommendations: Strengthening, Endurance Training, Patient/Caregiver Education & Training, Equipment Evaluation, Education, & procurement, Balance Training, Gait Training, Functional Mobility Training, Safety Education & Training, Transfer Training  Safety Devices  Type of devices:  All fall risk precautions in place, Left in chair, Call light within reach, Chair alarm in place, Gait belt, Nurse notified    Goals  Short term goals  Time Frame for Short term goals: until d/c  Short term goal 1: Pt will amb 150 ft with LRAD, mod I and vital signs WNL  Short term goal 2: Pt will negotiate 1 curb step with 1 HR assist and SBA       Therapy Time   Individual Concurrent Group Co-treatment   Time In 4351         Time Out 1015         Minutes 23         Timed Code Treatment Minutes: [address] [facility], PT DPT

## 2021-07-06 NOTE — PLAN OF CARE
Problem: Falls - Risk of:  Goal: Will remain free from falls  Description: Will remain free from falls  Outcome: Ongoing  Goal: Absence of physical injury  Description: Absence of physical injury  Outcome: Ongoing     Problem: Breathing Pattern - Ineffective:  Goal: Ability to achieve and maintain a regular respiratory rate will improve  Description: Ability to achieve and maintain a regular respiratory rate will improve  Outcome: Ongoing     Problem: Pain:  Goal: Pain level will decrease  Description: Pain level will decrease  Outcome: Ongoing  Goal: Control of acute pain  Description: Control of acute pain  Outcome: Ongoing  Goal: Control of chronic pain  Description: Control of chronic pain  Outcome: Ongoing

## 2021-07-06 NOTE — CARE COORDINATION
.Chart reviewed. The patient is to return home with daughter Checo To, María Wong in law Rogelio Garcia and grandson Shaquille Colunga (13year old) and her dog pinky at discharge. The patient has a PCP Dr. Sachin Shankar and insurance with medicare/medicaid and her family can drive to doctor's appointments without issues. She states she has never driven in her life. The patient can afford and take her medications as prescribed. She states she has had [facility] in the past but is not active with them at this time. The daughter Elana Alex is at bedside and was given information about passport to provide some additional help at home. The patient is independent in ADL's. The patient denies any other needs at this time. The patient states she may need home oxygen as well. She will be getting a home oxygen evaluation. The patient states she will let this CM know if she wants [facility] at discharge. She is going to talk her daughter about it and get back with this CM.

## 2021-07-06 NOTE — PROGRESS NOTES
Blood pressures are obtained for morning Bumex and Cardizem medications at 0600. Vicki Magana notified that left arm blood pressure is 84/49 heart rate 82, in right arm blood pressure is 121/59 heart rate is 87. Ordered to give 30mg p.o. Cardizem and hold Bumex injection.      Luis E Parada RN  6:30 AM

## 2021-07-06 NOTE — PLAN OF CARE
Problem: Falls - Risk of:  Goal: Will remain free from falls  Description: Will remain free from falls  7/6/2021 1841 by Tito Gallagher RN  Outcome: Ongoing  7/6/2021 0602 by Brandi Henry RN  Outcome: Ongoing  Goal: Absence of physical injury  Description: Absence of physical injury  7/6/2021 1841 by Tito Gallagher RN  Outcome: Ongoing  7/6/2021 0602 by Brandi Henry RN  Outcome: Ongoing     Problem: Breathing Pattern - Ineffective:  Goal: Ability to achieve and maintain a regular respiratory rate will improve  Description: Ability to achieve and maintain a regular respiratory rate will improve  7/6/2021 1841 by Tito Gallagher RN  Outcome: Ongoing  7/6/2021 0602 by Brandi Henry RN  Outcome: Ongoing     Problem: Pain:  Goal: Pain level will decrease  Description: Pain level will decrease  7/6/2021 1841 by Tito Gallagher RN  Outcome: Ongoing  7/6/2021 0602 by Brandi Henry RN  Outcome: Ongoing  Goal: Control of acute pain  Description: Control of acute pain  7/6/2021 1841 by Tito Gallagher RN  Outcome: Ongoing  7/6/2021 0602 by Brandi Henry RN  Outcome: Ongoing  Goal: Control of chronic pain  Description: Control of chronic pain  7/6/2021 1841 by Tito Gallagher RN  Outcome: Ongoing  7/6/2021 0602 by Brandi Henry RN  Outcome: Ongoing

## 2021-07-06 NOTE — CONSULTS
dialysis    Cancer Brother         pancreas    Cancer Brother         pancreas    Diabetes Sister        SOCIAL HISTORY    Social History     Tobacco Use    Smoking status: Former Smoker     Packs/day: 0.25     Years: 31.00     Pack years: 7.75     Types: Cigarettes     Quit date:      Years since quittin.5    Smokeless tobacco: Never Used    Tobacco comment: 2 cigarettes  a day   Substance Use Topics    Alcohol use: Yes     Alcohol/week: 4.0 - 6.0 standard drinks     Types: 4 - 6 Cans of beer per week    Drug use: No       ALLERGIES    Allergies   Allergen Reactions    Aspirin Rash       MEDICATIONS    No current facility-administered medications on file prior to encounter.      Current Outpatient Medications on File Prior to Encounter   Medication Sig Dispense Refill    digoxin (LANOXIN) 125 MCG tablet Take 1 tablet by mouth daily 30 tablet 5    rivaroxaban (XARELTO) 15 MG TABS tablet Take 1 tablet by mouth daily (with breakfast) 30 tablet 5    allopurinol (ZYLOPRIM) 100 MG tablet TAKE 1 TABLET BY MOUTH DAILY 30 tablet 0    Blood Pressure Monitor KIT 1 kit by Does not apply route once for 1 dose 1 kit 0    ipratropium (ATROVENT) 0.03 % nasal spray 2 sprays by Each Nostril route every 12 hours 1 Bottle 3         Objective:      BP (!) 94/58   Pulse 80   Temp 98.2 °F (36.8 °C) (Oral)   Resp 16   Ht 5' 3\" (1.6 m)   Wt 161 lb 3.2 oz (73.1 kg)   SpO2 100%   BMI 28.56 kg/m²   Kevyn Risk Score: Kevyn Scale Score: 21    LABS    CBC:   Lab Results   Component Value Date    WBC 6.4 2021    RBC 3.33 2021    HGB 11.0 2021    HCT 32.6 2021    MCV 97.9 2021    MCH 33.0 2021    MCHC 33.7 2021    RDW 13.8 2021    PLT 92 2021    MPV 9.9 2021     CMP:    Lab Results   Component Value Date     2021    K 3.4 2021    CL 83 2021    CO2 40 2021    BUN 24 2021    CREATININE 1.0 2021    GFRAA >60 2021 AGRATIO 1.0 03/12/2020    LABGLOM 55 07/06/2021    GLUCOSE 94 07/06/2021    PROT 6.3 07/05/2021    PROT 7.2 05/19/2011    LABALBU 3.1 07/05/2021    CALCIUM 9.1 07/06/2021    BILITOT 1.6 07/05/2021    ALKPHOS 133 07/05/2021    AST 56 07/05/2021    ALT 19 07/05/2021     Albumin:    Lab Results   Component Value Date    LABALBU 3.1 07/05/2021     PT/INR:    Lab Results   Component Value Date    PROTIME 58.9 07/05/2021    INR 4.64 07/05/2021     HgBA1c:  No results found for: LABA1C      Assessment:     Patient Active Problem List   Diagnosis    [facility] disease) [facility]    Tobacco abuse disorder    Gout, arthritis    Hyponatremia    H/O alcohol abuse    Hypertension, essential    Chronic atrial fibrillation (HCC)    Acute idiopathic gout involving toe    Shortness of breath    Gout of foot    Gout    Atrial fibrillation with RVR (HCC)    Congestive heart failure due to high blood pressure (HCC)    Acute hyponatremia    Acute on chronic heart failure with preserved ejection fraction (HFpEF) ([facility]    Fall       Measurements:  Incision 08/18/15 Eye (Active)   Number of days: 2149       Wound 07/06/21 Radial Left; Inner (Active)   Wound Image   07/06/21 1545   Wound Etiology Other 07/06/21 1545   Dressing Status Other (Comment) 07/06/21 1545   Wound Cleansed Cleansed with saline 07/06/21 1545   Dressing/Treatment Open to air 07/06/21 1545   Wound Length (cm) 1.5 cm 07/06/21 1545   Wound Width (cm) 1.5 cm 07/06/21 1545   Wound Depth (cm) 0 cm 07/06/21 1545   Wound Surface Area (cm^2) 2.25 cm^2 07/06/21 1545   Wound Volume (cm^3) 0 cm^3 07/06/21 1545   Distance Tunneling (cm) 0 cm 07/06/21 1545   Tunneling Position ___ O'Clock 0 07/06/21 1545   Undermining Starts ___ O'Clock 0 07/06/21 1545   Undermining Ends___ O'Clock 0 07/06/21 1545   Undermining Maxium Distance (cm) 0 07/06/21 1545   Wound Assessment Fluid filled blister 07/06/21 1545   Drainage Amount None 07/06/21 1545   Odor None 07/06/21 1545   Tika-wound Assessment Ecchymosis 07/06/21 1545   Margins Attached edges 07/06/21 1545   Number of days: 0       Response to treatment:  Well tolerated by patient. Pain Assessment:  Severity:  none  Quality of pain: na  Wound Pain Timing/Severity: na  Premedicated: no    Plan:     Plan of Care: Wound 07/06/21 Radial Left; Inner-Dressing/Treatment: Open to air     Pt in bed. Agreeable to wound assessment. Left arm blister noted with surrounding edema and ecchymosis. From IV infiltration per nurse report. Fluid filled blister intact. Picture and measurement taken. Recommend BELLO if blister intact with elevation and ice. If blister opens, can apply Optifoam AG and roll gauze. Updated nurse and MD. Pt is generally not at risk for skin breakdown NYLA Bagley. Specialty Bed Required : no  [] Low Air Loss   [] Pressure Redistribution  [] Fluid Immersion  [] Bariatric  [] Total Pressure Relief  [] Other:     Discharge Plan:  Placement for patient upon discharge: tbd  Hospice Care: no  Patient appropriate for Outpatient [address]: no    Patient/Caregiver Teaching:  Level of patient/caregiver understanding able to:   Voiced understanding.         Electronically signed by Ike Cardenas RN, CWOCN on 7/6/2021 at 4:18 PM

## 2021-07-07 NOTE — PROGRESS NOTES
Comprehensive Nutrition Assessment    Type and Reason for Visit:  Initial (Swing Bed)    Nutrition Recommendations/Plan: Add magic cup at lunch and dinner meals    Nutrition Assessment:  Pt admitted with CHF, sob, diarrhea, COPD. Intakes variable 1-100%- states she never ate this much at home. She is edentulous but denies trouble with chewing or swallowing, skin with blister to her arm. Unable to quantify weight hx. Malnutrition Assessment:  Malnutrition Status:  Insufficient data    Context:  Chronic Illness     Findings of the 6 clinical characteristics of malnutrition:      Estimated Daily Nutrient Needs:  Energy (kcal):  3002-4676; Weight Used for Energy Requirements:  Current     Protein (g):  55-69; Weight Used for Protein Requirements:  Current (.8-1.0)        Fluid (ml/day):  1831-9089; Method Used for Fluid Requirements:  1 ml/kcal      Nutrition Related Findings:  labs reviewed Na 131, K 3.0, BUN 32, Cr 1.1      Wounds:   (blister)       Current Nutrition Therapies:    ADULT DIET; Regular; Low Sodium (2 gm); 1500 ml    Anthropometric Measures:  · Height: 5' 3\" (160 cm)  · Current Body Weight: 153 lb (69.4 kg)   · Admission Body Weight: 153 lb (69.4 kg)    · Usual Body Weight:  (unable to determine)     · Ideal Body Weight: 115 lbs; % Ideal Body Weight 133 %   · BMI: 27.1  · Adjusted Body Weight:  ; No Adjustment   · BMI Categories: Overweight (BMI 25.0-29. 9)       Nutrition Diagnosis:   · In context of chronic illness, Predicted inadequate energy intake related to partial or complete edentulism as evidenced by intake 0-25%, intake 26-50%, poor dentition      Nutrition Interventions:   Food and/or Nutrient Delivery:  Continue Current Diet, Start Oral Nutrition Supplement  Nutrition Education/Counseling:  Education initiated   Coordination of Nutrition Care:  Continue to monitor while inpatient, Coordination of Community Care    Goals:  Oral intakes will be at least 51% of her nutritional needs during her stay       Nutrition Monitoring and Evaluation:   Behavioral-Environmental Outcomes:   Other (Comment)   Food/Nutrient Intake Outcomes:  Supplement Intake, Food and Nutrient Intake  Physical Signs/Symptoms Outcomes:  Biochemical Data, Chewing or Swallowing, Skin, Weight, Meal Time Behavior     Discharge Planning:    Continue current diet     Electronically signed by Dusty Torre RD, LD on 7/7/21 at 12:45 PM EDT    Contact: [phone]

## 2021-07-07 NOTE — DISCHARGE SUMMARY
Discharge Summary      Patient ID: Mart Gupta      Patient's PCP: Bonilla Khan MD    Admit Date: 7/3/2021     Discharge Date:  7/7/21    Admitting Provider: Suzanne Patino MD    Discharging Provider: Keyona Ashraf DO     Reason for this admission: Fall at home, Afib RVR,  leg edema due to CHF and COPD, electrolyte imbalances (magnesium, sodium, potassium)      Discharge Diagnoses: Active Hospital Problems    Diagnosis Date Noted    Fall [W19. XXXA]     Acute hyponatremia [E87.1] 07/04/2021    Acute on chronic heart failure with preserved ejection fraction (HFpEF) ([facility]) [I50.33] 07/04/2021    Shortness of breath [R06.02] 07/03/2021    Gout of foot [M10.9] 07/03/2021    Chronic atrial fibrillation (HCC) [I48.20] 03/12/2020    Hypertension, essential [I10] 01/05/2017    H/O alcohol abuse [F10.11] 01/19/2016    COPD (chronic obstructive pulmonary disease) ([facility]) [J44.9]      Acute on chronic probable combination systolic/diastolic heart failure with preserved ejection fraction     acute afib RVR (resolved) and now Chronic atrial fibrillation (HCC)\  Pulmonary HTN - moderate - probable type 3 due to lung disease -     COPD (chronic obstructive pulmonary disease) ([facility] [address] with exacerbation  Hyponatremia      H/O alcohol abuse     Alcoholic hepatitis  Thrombocytopenia due to EtOH use -    Hypertension, essential with hypotension   Left forearm blister without cellulitis due to IV Dye infiltrate during hospitalization   history of  Gout of foot with no current flare-  ABNORMAL CT SCAN  Chest     Procedures:  none    Consults:   IP CONSULT TO HEART FAILURE NURSE/COORDINATOR  IP CONSULT TO DIETITIAN  IP CONSULT TO CARDIOLOGY  IP CONSULT TO SOCIAL WORK  IP CONSULT TO HOME CARE NEEDS      Briefly:   68 yo female admitted with CHF exacerbation , afib RVR, COPD exacrbation,,  EtOH cirrhosis, thrombocytopenia, and hyponatremia. Hospital Course:    Admitted, placed on cardizem drip but became hypotensive.   Midodrine was started. Afib RVR resolved. Changed to oral cardizem. Cardiology consulted. She was diuresed with IV Bumex (5200cc cumulatrive since admission, 8 pounds by scale weight) and prior to discharge, states breathing is better, less SOB and her leg edema is greatly improved. She had oximetry study done and requires 2 liter oxygen to maintain sat above 90%. As part of evaluation CHF, COPD and SOB, she had CTA chest with no PE but with mass in RLQ . Compared [id number], there is a NEW RLL lentiform suspicious  masslike opacity 2.8 x1.4cm - needs outpatient PET scan and pulm follow up as outpatient for possible biospy. Unfortanately, the  IV CT scan dye infiltration with blister formation to left arm but no cellulitis. She states increased bruisability but no active bleeding and her initial INR was above 4.0;  ASA and xarelto held until INR and platelets improved.    CT scam did show Pulmonary HTN - moderate - probable type 3 due to lung disease   As for her   COPD (chronic obstructive pulmonary disease) ([address] with exacerbation, she was treated with antibitoics, nebs but did not need steroids. She is being discharged home on 5 more days of doxycycline. She had some Hyponatremia due to spironolactone and prior EtOH use (beer)  And this showed an improving trend prior to discharge. As for her history of EtOH dependence, patient did not show any signs of DTs while in hospital. She was given thiamine, folate and multivitamin. She will need an outpatient evalution of her alcholic hepatitis, her work up was negative for ascites or cirrhosis. Her PCP will need to determine if GI consult is needed as outpatient. Extended conversation with family regarding signs and symptoms of CHF and monitoring blood pressure/pulse before giving midodrine or cardizem. Home health order placed.   HOSPITAL BHAT Problems    Diagnosis Date Noted    Fall [W19. XXXA]     Acute hyponatremia [E87.1] 07/04/2021    Acute on chronic heart failure with preserved ejection fraction (HFpEF) ([facility] [I50.33] 07/04/2021    Shortness of breath [R06.02] 07/03/2021    Gout of foot [M10.9] 07/03/2021    Chronic atrial fibrillation ([facility] [I48.20] 03/12/2020    Hypertension, essential [I10] 01/05/2017    H/O alcohol abuse [F10.11] 01/19/2016    COPD (chronic obstructive pulmonary disease) ([place] [address] [J44.9]        Disposition: home with home health    Discharged Condition: Stable    Vital Signs  Temp: 97.3 °F (36.3 °C)  Pulse: 84  Resp: 12  BP: (!) 120/59  SpO2: 97 %  O2 Device: Nasal cannula  O2 Flow Rate (L/min): 2 L/min    Vital signs reviewed in electronic chart. Physical exam  Constitutional:  Well developed, well nourished, no acute distress. Eyes:  PERRL, conjunctiva normal, EOMI. No scleral icterus  Respiratory:  No respiratory distress, decreased  breath sounds, bibase course rales, no rhonchi, no wheeze; no CVA tenderness;   Cardiovascular:  Irreg/irreg, rate controlled, +  murmur. GI:  Soft, nondistended, normal bowel sounds, nontender, no organomegaly, no mass. Musculoskeletal: 1+ pitting edema,   Patient is moving all extremities. Left forearm with 1.5cm serous filled blister from IV dye infiltration. No signs of celluitis - not hot to touch; Large hematoma (approx 8cm) just right of T12 from fall prior to admission. Tender, not indurated, no signs of cellulitis, no palpable rib crepitus with inspiration. Psychiatric:  Speech and behavior appropriate. No signs of DT's;  CIWA scale 0    Activity: activity as tolerated  Diet: cardiac diet  Follow Up: Primary Care Physician in 2 days to recheck BMP CBC, BP   Follow up with cardiology in 1 week  Follow up with pulmonology for pulmonary mass in 1-2 weeks    Patient to proceed with the following lab CBC,BMPin 2 days    Labs:  For convenience and continuity at follow-up the following most recent labs are provided:    CBC:   Lab Results   Component Value Date    WBC 5.4 07/07/2021    HGB 10.3 07/07/2021    HCT 31.0 07/07/2021     07/07/2021       RENAL:   Lab Results   Component Value Date     07/07/2021    K 3.3 07/07/2021    CL 79 07/07/2021    CO2 49 07/07/2021    BUN 32 07/07/2021    CREATININE 1.0 07/07/2021         Discharge Medications:     Current Discharge Medication List           Details   nitroGLYCERIN (NITROSTAT) 0.4 MG SL tablet up to max of 3 total doses. If no relief after 1 dose, call 911. Qty: 25 tablet, Refills: 0      ipratropium-albuterol (DUONEB) 0.5-2.5 (3) MG/3ML SOLN nebulizer solution Inhale 3 mLs into the lungs every 4 hours (while awake)  Qty: 360 mL, Refills: 0      dilTIAZem (CARDIZEM) 30 MG tablet Take 1 tablet by mouth every 8 hours Do not take if your pulse is less than 60 or sbp under 100  Qty: 120 tablet, Refills: 0      bumetanide (BUMEX) 2 MG tablet Take 1 tablet by mouth daily  Qty: 30 tablet, Refills: 0      folic acid (FOLVITE) 1 MG tablet Take 1 tablet by mouth daily  Qty: 30 tablet, Refills: 0      polyethylene glycol (GLYCOLAX) 17 g packet Take 17 g by mouth daily as needed for Constipation  Qty: 527 g, Refills: 1      magnesium oxide (MAG-OX) 400 (241.3 Mg) MG TABS tablet Take 1 tablet by mouth daily  Qty: 30 tablet, Refills: 0      potassium chloride (KLOR-CON M) 20 MEQ extended release tablet Take 2 tablets by mouth 2 times daily  Qty: 60 tablet, Refills: 0      Multiple Vitamins-Minerals (THERAPEUTIC MULTIVITAMIN-MINERALS) tablet Take 1 tablet by mouth daily  Qty: 30 tablet, Refills: 0      midodrine (PROAMATINE) 5 MG tablet Take 1 tablet by mouth 3 times daily (with meals) Do not lay down for 30 minutes after taking medicine. Do not take at bedtime. DO NOT TAKE if your systolic blood pressure is above 120.   Qty: 42 tablet, Refills: 0      doxycycline hyclate (VIBRA-TABS) 100 MG tablet Take 1 tablet by mouth every 12 hours for 5 days  Qty: 10 tablet, Refills: 0      thiamine mononitrate (THIAMINE) 100 MG tablet Take 1 tablet by mouth daily  Qty: 30 tablet, Refills: 0              Details   rivaroxaban (XARELTO) 15 MG TABS tablet Take 1 tablet by mouth daily (with breakfast)  Qty: 30 tablet, Refills: 0      digoxin (LANOXIN) 125 MCG tablet Take 1 tablet by mouth daily  Qty: 30 tablet, Refills: 0      ipratropium (ATROVENT) 0.03 % nasal spray 2 sprays by Each Nostril route every 12 hours  Qty: 1 Bottle, Refills: 0      allopurinol (ZYLOPRIM) 100 MG tablet Take 1 tablet by mouth daily  Qty: 30 tablet, Refills: 0              Details   Blood Pressure Monitor KIT 1 kit by Does not apply route once for 1 dose  Qty: 1 kit, Refills: 0                Total discharge time 45 minutes    Signed:  Electronically signed by Clay Cristobal DO on 7/7/2021 at 4:04 PM       Thank you Stephon Zuniga MD for the opportunity to be involved in this patient's care

## 2021-07-07 NOTE — CARE COORDINATION
Patient wants [facility] at discharge.  CM called referral to    @Phone: [phone] and fax facesheet, [address] order, AVS and h&p to [phone]

## 2021-07-07 NOTE — PLAN OF CARE
Problem: Falls - Risk of:  Goal: Will remain free from falls  Outcome: Ongoing  Goal: Absence of physical injury  Outcome: Ongoing     Problem: Breathing Pattern - Ineffective:  Goal: Ability to achieve and maintain a regular respiratory rate will improve  Outcome: Ongoing     Problem: Pain:  Goal: Pain level will decrease  Outcome: Ongoing  Goal: Control of acute pain  Outcome: Ongoing  Goal: Control of chronic pain  Outcome: Ongoing     Problem: Falls - Risk of:  Goal: Will remain free from falls  Outcome: Ongoing  Goal: Absence of physical injury  Outcome: Ongoing     Problem: Breathing Pattern - Ineffective:  Goal: Ability to achieve and maintain a regular respiratory rate will improve  Outcome: Ongoing     Problem: Pain:  Goal: Pain level will decrease  Outcome: Ongoing  Goal: Control of acute pain  Outcome: Ongoing  Goal: Control of chronic pain  Outcome: Ongoing     Problem: Falls - Risk of:  Goal: Will remain free from falls  Outcome: Ongoing  Goal: Absence of physical injury  Outcome: Ongoing     Problem: Breathing Pattern - Ineffective:  Goal: Ability to achieve and maintain a regular respiratory rate will improve  Outcome: Ongoing     Problem: Pain:  Goal: Pain level will decrease  Outcome: Ongoing  Goal: Control of acute pain  Outcome: Ongoing  Goal: Control of chronic pain  Outcome: Ongoing     Problem: Falls - Risk of:  Goal: Will remain free from falls  Outcome: Ongoing  Goal: Absence of physical injury  Outcome: Ongoing     Problem: Breathing Pattern - Ineffective:  Goal: Ability to achieve and maintain a regular respiratory rate will improve  Outcome: Ongoing     Problem: Pain:  Goal: Pain level will decrease  Outcome: Ongoing  Goal: Control of acute pain  Outcome: Ongoing  Goal: Control of chronic pain  Outcome: Ongoing

## 2021-07-12 NOTE — ED NOTES
Etmidate 20 mg given IO per NARINDER Gracia RN. Succinylcholine 100 mg given IO per NARINDER Gracia RN.        Jackie Ragsdale RN  07/12/21 [id number]

## 2021-07-12 NOTE — ED NOTES
To room. Noted no chest rising. Spoke with family to advise them I would get Dr. Brian Jacobs. They state that they know she is gone.       Pat Zhu RN  07/12/21 [id number]

## 2021-07-12 NOTE — ED PROVIDER NOTES
Emergency Department Encounter  Location: [facility] [address] [place] [address]    Patient: Paula Hernandes  MRN: [id number]  : 1950  Date of evaluation: 2021  ED Provider: Casper Maguire DO, FACEP    Chief Complaint:    Altered Mental Status (to room per [facility]. Found unresponsive. Periods of apnea. . EMS bagging pt. )    [place]:  Paula Hernandes is a 69 y.o. female that presents to the emergency department by squad. Squad was called to her home with complaints of her unresponsive. The family called. Upon arrival of the squad found the patient laying in her bed unresponsive. She was posturing and her eyes were deviating to the right. Is not clear how long this had been occurring. She was placed on the monitor and they noticed a run of A. fib. The patient was recently seen for generalized weakness. She presents to the emergency department unresponsive being bagged. They state that she became agonal in route. They were unable to establish an IV. The patient is relatively unresponsive to painful stimuli and is not making purposeful movements upon arrival.  She is not protecting her airway and has no gag reflex. Nasal trumpet was placed by the squad.       ROS - see HPI, below listed is current ROS at time of my eval:  Unable to be obtained as the patient is unresponsive    Past Medical History:   Diagnosis Date    Acute respiratory failure ([address] 10/20/2015    After cataract surgery, unable to wean her off    Atrial fibrillation ([address]     Cataract extraction status of right eye 2015    Chronic atrial fibrillation ([address] 3/12/2020    New onset atrial fibriallation Metoprolol ER 25 mg daily Refer to cardiologist        Colonoscopy refused     discussed in     Congestive heart failure due to high blood pressure ([address] 7/3/2021    COPD (chronic obstructive pulmonary disease) ([address]     seen Dr Fina Welch    Depression 2015    resolved    Gout     H/O 24 hour EKG monitoring 2020    Basic rhythm is sinus. One episode of atrial fib RVR Recommend anticoagulation and possible cardioversion.  H/O echocardiogram 10/27/2020    EF 45%. Moderate AR & TR. Mild MR, Moderate Pulmonary HTN.  History of nuclear stress test 10/27/2020    EF 60%. Normal     Hypertension     Mammogram declined     discussed in     Pneumonia     Tobacco abuse disorder      Past Surgical History:   Procedure Laterality Date    CATARACT REMOVAL WITH IMPLANT  10/20/2015    both eyes    DENTAL SURGERY  2016    awaiting full dentures    FRACTURE SURGERY Left ~ -     wrist surgery    HYSTERECTOMY  In her late s     Family History   Problem Relation Age of Onset    Cancer Mother         Stomach CA    Heart Disease Father     Kidney Disease Father         Was on dialysis    Cancer Brother         pancreas    Cancer Brother         pancreas    Diabetes Sister      Social History     Socioeconomic History    Marital status:      Spouse name: Not on file    Number of children: 3    Years of education: Not on file    Highest education level: Not on file   Occupational History    Not on file   Tobacco Use    Smoking status: Former Smoker     Packs/day: 0.25     Years: 31.00     Pack years: 7.75     Types: Cigarettes     Quit date:      Years since quittin.5    Smokeless tobacco: Never Used    Tobacco comment: 2 cigarettes  a day   Substance and Sexual Activity    Alcohol use:  Yes     Alcohol/week: 4.0 - 6.0 standard drinks     Types: 4 - 6 Cans of beer per week    Drug use: No    Sexual activity: Not Currently   Other Topics Concern    Not on file   Social History Narrative    Not on file     Social Determinants of Health     Financial Resource Strain:     Difficulty of Paying Living Expenses:    Food Insecurity:     Worried About Running Out of Food in the Last Year:     [address] in the Last Year:    Transportation Needs:     Lack of Transportation (Medical):  Lack of Transportation (Non-Medical):    Physical Activity:     Days of Exercise per Week:     Minutes of Exercise per Session:    Stress:     Feeling of Stress :    Social Connections:     Frequency of Communication with Friends and Family:     Frequency of Social Gatherings with Friends and Family:     Attends [employer] witness Services:     Active Member of Clubs or Organizations:     Attends Club or Organization Meetings:     Marital Status:    Intimate Partner Violence:     Fear of Current or Ex-Partner:     Emotionally Abused:     Physically Abused:     Sexually Abused:      Current Facility-Administered Medications   Medication Dose Route Frequency Provider Last Rate Last Admin    vancomycin 1000 mg IVPB in 250 mL D5W addavial  1,000 mg Intravenous Once Jarome Rocks, DO        0.9 % sodium chloride infusion   Intravenous Continuous Jarome Rocks, DO   Stopped at 07/12/21 1124    morphine sulfate (PF) injection 4 mg  4 mg Intravenous Q30 Min PRN Jarome Rocks, DO        ondansetron [facility]) injection 4 mg  4 mg Intravenous Q30 Min PRN Jarome Rocks, DO         Current Outpatient Medications   Medication Sig Dispense Refill    ipratropium (ATROVENT) 0.02 % nebulizer solution Take 2.5 mLs by nebulization every 6 hours as needed for Wheezing 2.5 mL 3    albuterol (PROVENTIL) (5 MG/ML) 0.5% nebulizer solution Take 0.5 mLs by nebulization every 6 hours as needed for Wheezing 120 each 3    nitroGLYCERIN (NITROSTAT) 0.4 MG SL tablet up to max of 3 total doses.  If no relief after 1 dose, call 911. 25 tablet 0    ipratropium-albuterol (DUONEB) 0.5-2.5 (3) MG/3ML SOLN nebulizer solution Inhale 3 mLs into the lungs every 4 hours (while awake) 360 mL 0    rivaroxaban (XARELTO) 15 MG TABS tablet Take 1 tablet by mouth daily (with breakfast) 30 tablet 0    dilTIAZem (CARDIZEM) 30 MG tablet Take 1 tablet by mouth every 8 hours Do not take if your pulse is less than 60 or sbp under 100 120 tablet 0  digoxin (LANOXIN) 125 MCG tablet Take 1 tablet by mouth daily 30 tablet 0    ipratropium (ATROVENT) 0.03 % nasal spray 2 sprays by Each Nostril route every 12 hours 1 Bottle 0    bumetanide (BUMEX) 2 MG tablet Take 1 tablet by mouth daily 30 tablet 0    allopurinol (ZYLOPRIM) 100 MG tablet Take 1 tablet by mouth daily 30 tablet 0    folic acid (FOLVITE) 1 MG tablet Take 1 tablet by mouth daily 30 tablet 0    polyethylene glycol (GLYCOLAX) 17 g packet Take 17 g by mouth daily as needed for Constipation 527 g 1    magnesium oxide (MAG-OX) 400 (241.3 Mg) MG TABS tablet Take 1 tablet by mouth daily 30 tablet 0    potassium chloride (KLOR-CON M) 20 MEQ extended release tablet Take 2 tablets by mouth 2 times daily 60 tablet 0    Multiple Vitamins-Minerals (THERAPEUTIC MULTIVITAMIN-MINERALS) tablet Take 1 tablet by mouth daily 30 tablet 0    midodrine (PROAMATINE) 5 MG tablet Take 1 tablet by mouth 3 times daily (with meals) Do not lay down for 30 minutes after taking medicine. Do not take at bedtime. DO NOT TAKE if your systolic blood pressure is above 120. 42 tablet 0    doxycycline hyclate (VIBRA-TABS) 100 MG tablet Take 1 tablet by mouth every 12 hours for 5 days 10 tablet 0    thiamine mononitrate (THIAMINE) 100 MG tablet Take 1 tablet by mouth daily 30 tablet 0    Blood Pressure Monitor KIT 1 kit by Does not apply route once for 1 dose 1 kit 0     Allergies   Allergen Reactions    Aspirin Rash       Nursing Notes Reviewed    Physical Exam:  ED Triage Vitals [07/12/21 0915]   Enc Vitals Group      /87      Pulse 99      Resp 28      Temp 95.9 °F (35.5 °C)      Temp Source Temporal      SpO2 (!) 78 %      Weight 153 lb (69.4 kg)      Height 5' 3\" (1.6 m)      Head Circumference       Peak Flow       Pain Score       Pain Loc       Pain Edu? Excl. in [address]? GENERAL APPEARANCE: Not awake and alert. Cooperative.   Moderate distress, not protecting her airway being bagged  HEAD: Normocephalic. Atraumatic. EYES: EOM's grossly intact. Sclera anicteric. Eyes are deviating to the right pupils equal and slowly reactive  ENT: Tolerates saliva. No trismus. She has no gag reflex  NECK: Supple. Trachea midline. CARDIO: Irregularly irregular. Bradycardic at times. Radial pulse 1+. LUNGS: Respirations unlabored. CTAB. ABDOMEN: Soft. Non-distended. Non-tender. EXTREMITIES: No acute deformities. SKIN: Warm and dry.   There is multiple bruising on her upper extremities  NEUROLOGICAL: Patient is showing posturing with her hands pointing towards the midline of the body  PSYCHIATRIC: Unable to be assessed    Labs:  Results for orders placed or performed during the hospital encounter of 07/12/21   Lactate, Sepsis   Result Value Ref Range    Lactic Acid, Sepsis 15.2 (HH) 0.5 - 1.9 mMOL/L   CBC   Result Value Ref Range    WBC 33.7 (HH) 4.0 - 10.5 K/CU MM    RBC 2.74 (L) 4.2 - 5.4 M/CU MM    Hemoglobin 9.4 (L) 12.5 - 16.0 GM/DL    Hematocrit 29.8 (L) 37 - 47 %    .8 (H) 78 - 100 FL    MCH 34.3 (H) 27 - 31 PG    MCHC 31.5 (L) 32.0 - 36.0 %    RDW 15.4 (H) 11.7 - 14.9 %    Platelets 439 403 - 993 K/CU MM    MPV 10.4 7.5 - 11.1 FL   Brain Natriuretic Peptide   Result Value Ref Range    Pro-BNP 3,405 (H) <300 PG/ML   Comprehensive Metabolic Panel   Result Value Ref Range    Sodium 135 135 - 145 MMOL/L    Potassium 7.1 (HH) 3.5 - 5.1 MMOL/L    Chloride 81 (L) 99 - 110 mMol/L    CO2 25 21 - 32 MMOL/L    BUN 95 (H) 6 - 23 MG/DL    CREATININE 2.5 (H) 0.6 - 1.1 MG/DL    Glucose 104 (H) 70 - 99 MG/DL    Calcium 10.5 8.3 - 10.6 MG/DL    Albumin 3.2 (L) 3.4 - 5.0 GM/DL    Total Protein 6.8 6.4 - 8.2 GM/DL    Total Bilirubin 2.6 (H) 0.0 - 1.0 MG/DL    ALT 92 (H) 10 - 40 U/L     (H) 15 - 37 IU/L    Alkaline Phosphatase 135 (H) 40 - 129 IU/L    GFR Non- 19 (L) >60 mL/min/1.73m2    GFR  23 (L) >60 mL/min/1.73m2    Anion Gap 29 (H) 4 - 16   Troponin   Result Value Ref Range    Troponin T 0.072 (H) <0.01 NG/ML   POCT Venous   Result Value Ref Range    pH, Alejandro 7.51 (H) 7.32 - 7.42    pCO2, Alejandro 34.9 (L) 38 - 52 mmHG    pO2, Alejandro 112.0 (H) 28 - 48 mmHG    Base Exc, Mixed 4.6 (H) 0 - 2.3    Base Excess HIDE 0 - 2.4    HCO3, Venous 27.8 (H) 19 - 25 MMOL/L    O2 Sat, Alejandro 98.8 (H) 50 - 70 %    CO2 Content 28.9 (H) 19 - 24 MMOL/L    Source: Venous    POCT Venous   Result Value Ref Range    pH, Alejandro 7.35 7.32 - 7.42    pCO2, Alejandro 50.2 38 - 52 mmHG    pO2, Alejandro 47.6 28 - 48 mmHG    Base Exc, Mixed 1.9 0 - 2.3    Base Excess HIDE 0 - 2.4    HCO3, Venous 27.7 (H) 19 - 25 MMOL/L    O2 Sat, Alejandro 80.4 (H) 50 - 70 %    CO2 Content 29.3 (H) 19 - 24 MMOL/L    Source: Venous    EKG 12 Lead   Result Value Ref Range    Ventricular Rate 82 BPM    Atrial Rate 94 BPM    QRS Duration 92 ms    Q-T Interval 374 ms    QTc Calculation (Bazett) 436 ms    R Axis 15 degrees    T Axis 87 degrees    Diagnosis       Atrial fibrillation  ST depression, consider subendocardial injury  Abnormal ECG  When compared with ECG of 03-JUL-2021 13:58,  ST more depressed in Anterior leads  T wave inversion no longer evident in Inferior leads  T wave inversion no longer evident in Lateral leads         EKG (if obtained): (All EKG's are interpreted by myself in the absence of a cardiologist)  The Ekg interpreted by me in the absence of a cardiologist shows. atrial fibrillation with a rate of 82  Axis is   Normal  QTc is  436  Intervals and Durations are unremarkable. No specific ST-T wave changes appreciated. There is ST depression in leads II, III and aVF. There is ST depression in V2 V3 V4 V5 and V6 this is changed from previous EKG of 3 July 2021    Radiographs (if obtained):  [] The following radiograph was interpreted by myself in the absence of a radiologist:  [x] Radiologist's Report reviewed at time of ED visit:  XR CHEST PORTABLE   Final Result   1. Endotracheal tube terminating 4 cm above the kristine.    2. No acute cardiopulmonary process identified. 3. Findings suggesting COPD. CT HEAD WO CONTRAST   Final Result   No acute intracranial abnormality. Stable generalized cerebral atrophy and chronic small vessel white matter   ischemic changes. Findings were discussed with Brad Mckeon at 10:28 am on 7/12/2021. CTA HEAD NECK W CONTRAST   Preliminary Result   1. 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 Moderate to severe stenosis   in the proximal subclavian arteries bilaterally. 2. No hemodynamically significant stenosis in the cervical internal carotid   arteries. 3. The cervical vertebral arteries are markedly diminutive and nearly   completely occluded bilaterally with minimal partial opacification within the   right vertebral artery V2 segment. 4. Moderate stenosis within the cavernous ICAs bilaterally. 5. Diminutive posterior circulation. Otherwise, no additional   hemodynamically significant stenosis or branch occlusion in the intracranial   arterial circulation. 6. The proximal esophagus is fluid distended. This could be due to   gastroesophageal reflux, and predispose the patient to aspiration. Procedure Note - Central Line:   Anne-Marie Moore was prepped and draped in standard bedside fashion in the left femoral vein area. Physical landmarks with ultrasound guidance was used to locate the central vein. Local anesthesia with 3ml of 1% lidocaine was injected. Seldinger technique was used for placement of the CVC in a non-pulsatile vasculature. All ports jose and flushed. The patient tolerated the procedure well and no acute complications occurred. Procedure Note - Intubation:  Patient was not protecting her airway and had no gag reflex upon arrival.  Pre-oxygenation was administered and the appropriate equipment and staff were made available at the bedside. Anne-Marie Moore was sedated/paralyzed; please see the chart for the drugs and dosages administered.  A Jesika 3 laryngoscope blade CMAC was used for a grade 2 view and a 7.5mm endotracheal tube was viewed to pass through the cords on the first attempt. The tube was secured at 22 cm to the lip. Tracheal position was confirmed using a colorimetric end-tidal CO2 detector, tube condensation and chest auscultation/visualization. Respiratory therapy is at the bedside and is assisting with ventilatory management. The total Critical Care time is 35 minutes which excludes separately billable procedures. ED Course and MDM:  Here in the emergency department the patient was intubated upon arrival.  She was also given an intraosseous line by paramedic Brigida Ruano under my supervision. This was in her right tibial area. A triple-lumen was placed in her left femoral artery. The patient was started on Levophed and she was given 1 round of epi when her pressure dropped. She was sent to CAT scan emergently because of felt she most likely had a intracranial bleed based on her clinical presentation however there was no evidence of bleed on the CT scan. Her family was called back to the ED to see her and informed me that her wishes were to not be intubated and to not be on life support and that she \"did not even want to come to the hospital\". The patient does have a DNR Comfort Care arrest order but it does not specify that she is to not be intubated and to not have pressors. The family was gathered at the bedside and I informed them that it appeared that she was having multisystem organ failure including sepsis and it appeared that she is having an acute MI. Her potassium was also elevated at 7 which may indicate renal failure and her 3 daughters agreed that they would like her to be extubated. I extubated her at 11:20 AM and her daughters were at the bedside.   The patient did hang on until 1206 where she finally expressed agonal cardiac beats on the monitor and she was pronounced dead at 12:06 PM.  Dr. Itz Sheehan has been paged.  Dr. De La Rosa Like stated he will sign the death certificate. The  was notified by the charge nurse and is aware of the case. This will not be a  case    Final Impression:  1. Sepsis with acute hypoxic respiratory failure and septic shock, due to unspecified organism ([facility]    2. Altered mental status, unspecified altered mental status type    3. Non-STEMI (non-ST elevated myocardial infarction) ([facility]    4. Hyperkalemia      DISPOSITION     Patient referred to: No follow-up provider specified.   Discharge medications:  New Prescriptions    No medications on file     (Please note that portions of this note may have been completed with a voice recognition program. Efforts were made to edit the dictations but occasionally words are mis-transcribed.)    Aleah Bustillos DO, [address] [facility]  Board certified in [address] [place] [address]  07/12/21 Jeane Pedraza DO  07/12/21 0851

## 2021-07-12 NOTE — ED NOTES
[place], lab, reports lactate 15.2. Dr. Deepali Melo and Janelle Mcallister RN notified.       Hiwot Alaniz RN  07/12/21 4513

## 2021-07-12 NOTE — ED NOTES
Patient extubated per family request at this time, patient sat-up in bed and put on non rebreather for comfort     Brandee Levin RN  07/12/21 2715

## 2021-07-12 NOTE — CARE COORDINATION
Upon 2nd attempt to reach for Care Transition discharge call Patient noted to be in ED. Per notes-- EMS bagging Pt. Current episode closed. Will follow if criteria met upon discharge.  Ld Perez RN, CTN